# Patient Record
Sex: MALE | Race: WHITE | Employment: OTHER | URBAN - METROPOLITAN AREA
[De-identification: names, ages, dates, MRNs, and addresses within clinical notes are randomized per-mention and may not be internally consistent; named-entity substitution may affect disease eponyms.]

---

## 2022-10-31 ENCOUNTER — HOSPITAL ENCOUNTER (OUTPATIENT)
Age: 52
Setting detail: SPECIMEN
Discharge: HOME | End: 2022-10-31

## 2022-10-31 ENCOUNTER — HOSPITAL ENCOUNTER (OUTPATIENT)
Dept: PREADMISSION TESTING | Age: 52
Discharge: HOME | End: 2022-11-04
Payer: COMMERCIAL

## 2022-10-31 VITALS
OXYGEN SATURATION: 98 % | SYSTOLIC BLOOD PRESSURE: 156 MMHG | WEIGHT: 228 LBS | BODY MASS INDEX: 30.88 KG/M2 | TEMPERATURE: 97.9 F | RESPIRATION RATE: 16 BRPM | HEIGHT: 72 IN | HEART RATE: 118 BPM | DIASTOLIC BLOOD PRESSURE: 96 MMHG

## 2022-10-31 DIAGNOSIS — Z01.818 PRE-OP TESTING: ICD-10-CM

## 2022-10-31 DIAGNOSIS — Z01.818 PRE-OP TESTING: Primary | ICD-10-CM

## 2022-10-31 LAB
ANION GAP SERPL CALCULATED.3IONS-SCNC: 14 MMOL/L (ref 9–17)
BUN BLDV-MCNC: 13 MG/DL (ref 6–20)
CALCIUM SERPL-MCNC: 9.5 MG/DL (ref 8.6–10.4)
CHLORIDE BLD-SCNC: 105 MMOL/L (ref 98–107)
CO2: 24 MMOL/L (ref 20–31)
CREAT SERPL-MCNC: 1.01 MG/DL (ref 0.7–1.2)
GFR SERPL CREATININE-BSD FRML MDRD: >60 ML/MIN/1.73M2
GLUCOSE BLD-MCNC: 67 MG/DL (ref 70–99)
HCT VFR BLD CALC: 56.1 % (ref 40.7–50.3)
HEMOGLOBIN: 16.6 G/DL (ref 13–17)
MCH RBC QN AUTO: 31 PG (ref 25.2–33.5)
MCHC RBC AUTO-ENTMCNC: 29.6 G/DL (ref 28.4–34.8)
MCV RBC AUTO: 104.9 FL (ref 82.6–102.9)
NRBC AUTOMATED: 0 PER 100 WBC
PDW BLD-RTO: 13.9 % (ref 11.8–14.4)
PLATELET # BLD: 271 K/UL (ref 138–453)
PMV BLD AUTO: 11.2 FL (ref 8.1–13.5)
POTASSIUM SERPL-SCNC: 5.1 MMOL/L (ref 3.7–5.3)
RBC # BLD: 5.35 M/UL (ref 4.21–5.77)
SODIUM BLD-SCNC: 143 MMOL/L (ref 135–144)
WBC # BLD: 13.7 K/UL (ref 3.5–11.3)

## 2022-10-31 PROCEDURE — 93005 ELECTROCARDIOGRAM TRACING: CPT | Performed by: ANESTHESIOLOGY

## 2022-10-31 RX ORDER — CLONAZEPAM 2 MG/1
2 TABLET ORAL NIGHTLY
COMMUNITY

## 2022-10-31 RX ORDER — LEVOTHYROXINE SODIUM 0.1 MG/1
100 TABLET ORAL 2 TIMES DAILY
COMMUNITY

## 2022-10-31 RX ORDER — LOSARTAN POTASSIUM 100 MG/1
100 TABLET ORAL DAILY
COMMUNITY

## 2022-10-31 RX ORDER — CYCLOBENZAPRINE HCL 10 MG
10 TABLET ORAL 3 TIMES DAILY PRN
COMMUNITY

## 2022-10-31 RX ORDER — ASPIRIN 81 MG/1
81 TABLET ORAL DAILY
COMMUNITY

## 2022-10-31 RX ORDER — LAMOTRIGINE 100 MG/1
100 TABLET ORAL 3 TIMES DAILY
COMMUNITY

## 2022-10-31 RX ORDER — EXENATIDE 2 MG
2 KIT SUBCUTANEOUS WEEKLY
COMMUNITY

## 2022-10-31 RX ORDER — ROPINIROLE 1 MG/1
1 TABLET, FILM COATED ORAL NIGHTLY
COMMUNITY

## 2022-10-31 RX ORDER — ATORVASTATIN CALCIUM 80 MG/1
80 TABLET, FILM COATED ORAL DAILY
COMMUNITY

## 2022-10-31 RX ORDER — CARBAMAZEPINE 200 MG/1
200 CAPSULE, EXTENDED RELEASE ORAL 2 TIMES DAILY
COMMUNITY

## 2022-10-31 RX ORDER — LIOTHYRONINE SODIUM 25 UG/1
25 TABLET ORAL 2 TIMES DAILY
COMMUNITY

## 2022-10-31 RX ORDER — BUPROPION HYDROCHLORIDE 300 MG/1
300 TABLET ORAL EVERY MORNING
COMMUNITY

## 2022-10-31 RX ORDER — RISPERIDONE 2 MG/1
2 TABLET ORAL DAILY
COMMUNITY

## 2022-10-31 RX ORDER — QUETIAPINE FUMARATE 400 MG/1
400 TABLET, FILM COATED ORAL NIGHTLY
COMMUNITY

## 2022-10-31 RX ORDER — CLONIDINE HYDROCHLORIDE 0.1 MG/1
0.1 TABLET ORAL 2 TIMES DAILY
COMMUNITY

## 2022-10-31 RX ORDER — GABAPENTIN 600 MG/1
600 TABLET ORAL 3 TIMES DAILY
COMMUNITY

## 2022-10-31 RX ORDER — GLIPIZIDE 10 MG/1
10 TABLET ORAL
COMMUNITY

## 2022-10-31 ASSESSMENT — PAIN DESCRIPTION - LOCATION: LOCATION: BACK;HIP

## 2022-10-31 ASSESSMENT — PAIN DESCRIPTION - ORIENTATION: ORIENTATION: RIGHT

## 2022-11-01 LAB
EKG ATRIAL RATE: 101 BPM
EKG P AXIS: 48 DEGREES
EKG P-R INTERVAL: 182 MS
EKG Q-T INTERVAL: 336 MS
EKG QRS DURATION: 96 MS
EKG QTC CALCULATION (BAZETT): 435 MS
EKG R AXIS: 62 DEGREES
EKG T AXIS: 53 DEGREES
EKG VENTRICULAR RATE: 101 BPM

## 2022-11-29 ENCOUNTER — ANESTHESIA EVENT (OUTPATIENT)
Dept: OPERATING ROOM | Age: 52
End: 2022-11-29
Payer: COMMERCIAL

## 2022-12-05 ENCOUNTER — HOSPITAL ENCOUNTER (OUTPATIENT)
Age: 52
LOS: 2 days | Discharge: HOME | End: 2022-12-07
Attending: STUDENT IN AN ORGANIZED HEALTH CARE EDUCATION/TRAINING PROGRAM | Admitting: STUDENT IN AN ORGANIZED HEALTH CARE EDUCATION/TRAINING PROGRAM
Payer: COMMERCIAL

## 2022-12-05 ENCOUNTER — ANESTHESIA (OUTPATIENT)
Dept: OPERATING ROOM | Age: 52
End: 2022-12-05
Payer: COMMERCIAL

## 2022-12-05 ENCOUNTER — APPOINTMENT (OUTPATIENT)
Dept: GENERAL RADIOLOGY | Age: 52
End: 2022-12-05
Attending: STUDENT IN AN ORGANIZED HEALTH CARE EDUCATION/TRAINING PROGRAM
Payer: COMMERCIAL

## 2022-12-05 DIAGNOSIS — G89.18 POST-OP PAIN: ICD-10-CM

## 2022-12-05 DIAGNOSIS — Z98.1 S/P LUMBAR SPINAL FUSION: Primary | ICD-10-CM

## 2022-12-05 LAB
GLUCOSE BLD-MCNC: 167 MG/DL (ref 75–110)
GLUCOSE BLD-MCNC: 177 MG/DL (ref 75–110)

## 2022-12-05 PROCEDURE — 2580000003 HC RX 258: Performed by: STUDENT IN AN ORGANIZED HEALTH CARE EDUCATION/TRAINING PROGRAM

## 2022-12-05 PROCEDURE — 2500000003 HC RX 250 WO HCPCS: Performed by: STUDENT IN AN ORGANIZED HEALTH CARE EDUCATION/TRAINING PROGRAM

## 2022-12-05 PROCEDURE — 2709999900 HC NON-CHARGEABLE SUPPLY: Performed by: STUDENT IN AN ORGANIZED HEALTH CARE EDUCATION/TRAINING PROGRAM

## 2022-12-05 PROCEDURE — 6360000002 HC RX W HCPCS: Performed by: STUDENT IN AN ORGANIZED HEALTH CARE EDUCATION/TRAINING PROGRAM

## 2022-12-05 PROCEDURE — 2580000003 HC RX 258: Performed by: NURSE ANESTHETIST, CERTIFIED REGISTERED

## 2022-12-05 PROCEDURE — 6370000000 HC RX 637 (ALT 250 FOR IP): Performed by: STUDENT IN AN ORGANIZED HEALTH CARE EDUCATION/TRAINING PROGRAM

## 2022-12-05 PROCEDURE — 6360000002 HC RX W HCPCS: Performed by: NURSE ANESTHETIST, CERTIFIED REGISTERED

## 2022-12-05 PROCEDURE — 2500000003 HC RX 250 WO HCPCS: Performed by: ANESTHESIOLOGY

## 2022-12-05 PROCEDURE — 6360000002 HC RX W HCPCS

## 2022-12-05 PROCEDURE — 6370000000 HC RX 637 (ALT 250 FOR IP): Performed by: NURSE PRACTITIONER

## 2022-12-05 PROCEDURE — 3700000001 HC ADD 15 MINUTES (ANESTHESIA): Performed by: STUDENT IN AN ORGANIZED HEALTH CARE EDUCATION/TRAINING PROGRAM

## 2022-12-05 PROCEDURE — 3600000014 HC SURGERY LEVEL 4 ADDTL 15MIN: Performed by: STUDENT IN AN ORGANIZED HEALTH CARE EDUCATION/TRAINING PROGRAM

## 2022-12-05 PROCEDURE — 6360000002 HC RX W HCPCS: Performed by: ANESTHESIOLOGY

## 2022-12-05 PROCEDURE — 3700000000 HC ANESTHESIA ATTENDED CARE: Performed by: STUDENT IN AN ORGANIZED HEALTH CARE EDUCATION/TRAINING PROGRAM

## 2022-12-05 PROCEDURE — 2060000000 HC ICU INTERMEDIATE R&B

## 2022-12-05 PROCEDURE — 3600000004 HC SURGERY LEVEL 4 BASE: Performed by: STUDENT IN AN ORGANIZED HEALTH CARE EDUCATION/TRAINING PROGRAM

## 2022-12-05 PROCEDURE — C1713 ANCHOR/SCREW BN/BN,TIS/BN: HCPCS | Performed by: STUDENT IN AN ORGANIZED HEALTH CARE EDUCATION/TRAINING PROGRAM

## 2022-12-05 PROCEDURE — 7100000001 HC PACU RECOVERY - ADDTL 15 MIN: Performed by: STUDENT IN AN ORGANIZED HEALTH CARE EDUCATION/TRAINING PROGRAM

## 2022-12-05 PROCEDURE — 2500000003 HC RX 250 WO HCPCS: Performed by: NURSE ANESTHETIST, CERTIFIED REGISTERED

## 2022-12-05 PROCEDURE — 82947 ASSAY GLUCOSE BLOOD QUANT: CPT

## 2022-12-05 PROCEDURE — C9359 IMPLNT,BON VOID FILLER-PUTTY: HCPCS | Performed by: STUDENT IN AN ORGANIZED HEALTH CARE EDUCATION/TRAINING PROGRAM

## 2022-12-05 PROCEDURE — 7100000000 HC PACU RECOVERY - FIRST 15 MIN: Performed by: STUDENT IN AN ORGANIZED HEALTH CARE EDUCATION/TRAINING PROGRAM

## 2022-12-05 PROCEDURE — 3209999900 FLUORO FOR SURGICAL PROCEDURES

## 2022-12-05 PROCEDURE — 2720000010 HC SURG SUPPLY STERILE: Performed by: STUDENT IN AN ORGANIZED HEALTH CARE EDUCATION/TRAINING PROGRAM

## 2022-12-05 DEVICE — CONNECTOR 779145555 TI SDLD SD 5.5 CL5.5
Type: IMPLANTABLE DEVICE | Site: SPINE LUMBAR | Status: FUNCTIONAL
Brand: CD HORIZON® SPINAL SYSTEM

## 2022-12-05 DEVICE — GRAFT BNE SUB SM 1ML CRYOPRESERVED VIABLE CORT CANC BNE: Type: IMPLANTABLE DEVICE | Site: SPINE LUMBAR | Status: FUNCTIONAL

## 2022-12-05 DEVICE — DBM 006005 PROGENIX PLUS 5CC
Type: IMPLANTABLE DEVICE | Site: SPINE LUMBAR | Status: FUNCTIONAL
Brand: PROGENIX® PUTTY AND PROGENIX® PLUS

## 2022-12-05 RX ORDER — METOCLOPRAMIDE HYDROCHLORIDE 5 MG/ML
10 INJECTION INTRAMUSCULAR; INTRAVENOUS ONCE
Status: COMPLETED | OUTPATIENT
Start: 2022-12-05 | End: 2022-12-05

## 2022-12-05 RX ORDER — BUPIVACAINE HYDROCHLORIDE AND EPINEPHRINE 5; 5 MG/ML; UG/ML
INJECTION, SOLUTION EPIDURAL; INTRACAUDAL; PERINEURAL
Status: DISCONTINUED
Start: 2022-12-05 | End: 2022-12-05

## 2022-12-05 RX ORDER — DEXAMETHASONE SODIUM PHOSPHATE 4 MG/ML
6 INJECTION, SOLUTION INTRA-ARTICULAR; INTRALESIONAL; INTRAMUSCULAR; INTRAVENOUS; SOFT TISSUE EVERY 6 HOURS
Status: COMPLETED | OUTPATIENT
Start: 2022-12-05 | End: 2022-12-06

## 2022-12-05 RX ORDER — SENNA AND DOCUSATE SODIUM 50; 8.6 MG/1; MG/1
1 TABLET, FILM COATED ORAL 2 TIMES DAILY
Status: DISCONTINUED | OUTPATIENT
Start: 2022-12-05 | End: 2022-12-07 | Stop reason: HOSPADM

## 2022-12-05 RX ORDER — GLYCOPYRROLATE 0.2 MG/ML
INJECTION INTRAMUSCULAR; INTRAVENOUS PRN
Status: DISCONTINUED | OUTPATIENT
Start: 2022-12-05 | End: 2022-12-05 | Stop reason: SDUPTHER

## 2022-12-05 RX ORDER — FAMOTIDINE 20 MG/1
20 TABLET, FILM COATED ORAL 2 TIMES DAILY
Status: DISCONTINUED | OUTPATIENT
Start: 2022-12-05 | End: 2022-12-07 | Stop reason: HOSPADM

## 2022-12-05 RX ORDER — MORPHINE SULFATE 10 MG/ML
INJECTION, SOLUTION INTRAMUSCULAR; INTRAVENOUS PRN
Status: DISCONTINUED | OUTPATIENT
Start: 2022-12-05 | End: 2022-12-05 | Stop reason: SDUPTHER

## 2022-12-05 RX ORDER — QUETIAPINE FUMARATE 100 MG/1
400 TABLET, FILM COATED ORAL NIGHTLY
Status: DISCONTINUED | OUTPATIENT
Start: 2022-12-05 | End: 2022-12-07 | Stop reason: HOSPADM

## 2022-12-05 RX ORDER — CLONIDINE HYDROCHLORIDE 0.1 MG/1
0.1 TABLET ORAL 2 TIMES DAILY
Status: DISCONTINUED | OUTPATIENT
Start: 2022-12-05 | End: 2022-12-07 | Stop reason: HOSPADM

## 2022-12-05 RX ORDER — OXYCODONE HYDROCHLORIDE 5 MG/1
10 TABLET ORAL EVERY 4 HOURS PRN
Status: DISCONTINUED | OUTPATIENT
Start: 2022-12-05 | End: 2022-12-07 | Stop reason: HOSPADM

## 2022-12-05 RX ORDER — VANCOMYCIN HYDROCHLORIDE 1 G/20ML
INJECTION, POWDER, LYOPHILIZED, FOR SOLUTION INTRAVENOUS
Status: DISCONTINUED
Start: 2022-12-05 | End: 2022-12-05

## 2022-12-05 RX ORDER — EPHEDRINE SULFATE/0.9% NACL/PF 50 MG/5 ML
SYRINGE (ML) INTRAVENOUS PRN
Status: DISCONTINUED | OUTPATIENT
Start: 2022-12-05 | End: 2022-12-05 | Stop reason: SDUPTHER

## 2022-12-05 RX ORDER — TRANEXAMIC ACID 10 MG/ML
INJECTION, SOLUTION INTRAVENOUS PRN
Status: DISCONTINUED | OUTPATIENT
Start: 2022-12-05 | End: 2022-12-05 | Stop reason: SDUPTHER

## 2022-12-05 RX ORDER — SODIUM CHLORIDE 0.9 % (FLUSH) 0.9 %
5-40 SYRINGE (ML) INJECTION PRN
Status: DISCONTINUED | OUTPATIENT
Start: 2022-12-05 | End: 2022-12-05 | Stop reason: HOSPADM

## 2022-12-05 RX ORDER — SODIUM CHLORIDE 0.9 % (FLUSH) 0.9 %
5-40 SYRINGE (ML) INJECTION PRN
Status: DISCONTINUED | OUTPATIENT
Start: 2022-12-05 | End: 2022-12-07 | Stop reason: HOSPADM

## 2022-12-05 RX ORDER — GABAPENTIN 600 MG/1
600 TABLET ORAL 3 TIMES DAILY
Status: DISCONTINUED | OUTPATIENT
Start: 2022-12-05 | End: 2022-12-05

## 2022-12-05 RX ORDER — LIOTHYRONINE SODIUM 25 UG/1
12.5 TABLET ORAL 2 TIMES DAILY
Status: DISCONTINUED | OUTPATIENT
Start: 2022-12-06 | End: 2022-12-07 | Stop reason: HOSPADM

## 2022-12-05 RX ORDER — METOPROLOL TARTRATE 5 MG/5ML
INJECTION INTRAVENOUS PRN
Status: DISCONTINUED | OUTPATIENT
Start: 2022-12-05 | End: 2022-12-05 | Stop reason: SDUPTHER

## 2022-12-05 RX ORDER — LOSARTAN POTASSIUM 50 MG/1
100 TABLET ORAL DAILY
Status: DISCONTINUED | OUTPATIENT
Start: 2022-12-06 | End: 2022-12-07 | Stop reason: HOSPADM

## 2022-12-05 RX ORDER — MORPHINE SULFATE 2 MG/ML
1 INJECTION, SOLUTION INTRAMUSCULAR; INTRAVENOUS EVERY 5 MIN PRN
Status: DISCONTINUED | OUTPATIENT
Start: 2022-12-05 | End: 2022-12-05 | Stop reason: HOSPADM

## 2022-12-05 RX ORDER — BUPROPION HYDROCHLORIDE 150 MG/1
300 TABLET ORAL EVERY MORNING
Status: DISCONTINUED | OUTPATIENT
Start: 2022-12-06 | End: 2022-12-07 | Stop reason: HOSPADM

## 2022-12-05 RX ORDER — CEFAZOLIN 2 G/1
INJECTION, POWDER, FOR SOLUTION INTRAMUSCULAR; INTRAVENOUS
Status: DISCONTINUED
Start: 2022-12-05 | End: 2022-12-05

## 2022-12-05 RX ORDER — ONDANSETRON 4 MG/1
4 TABLET, ORALLY DISINTEGRATING ORAL EVERY 8 HOURS PRN
Status: DISCONTINUED | OUTPATIENT
Start: 2022-12-05 | End: 2022-12-07 | Stop reason: HOSPADM

## 2022-12-05 RX ORDER — MIDAZOLAM HYDROCHLORIDE 1 MG/ML
INJECTION INTRAMUSCULAR; INTRAVENOUS PRN
Status: DISCONTINUED | OUTPATIENT
Start: 2022-12-05 | End: 2022-12-05 | Stop reason: SDUPTHER

## 2022-12-05 RX ORDER — BUPIVACAINE HYDROCHLORIDE AND EPINEPHRINE 5; 5 MG/ML; UG/ML
INJECTION, SOLUTION EPIDURAL; INTRACAUDAL; PERINEURAL PRN
Status: DISCONTINUED | OUTPATIENT
Start: 2022-12-05 | End: 2022-12-05 | Stop reason: ALTCHOICE

## 2022-12-05 RX ORDER — KETOROLAC TROMETHAMINE 30 MG/ML
INJECTION, SOLUTION INTRAMUSCULAR; INTRAVENOUS PRN
Status: DISCONTINUED | OUTPATIENT
Start: 2022-12-05 | End: 2022-12-05 | Stop reason: SDUPTHER

## 2022-12-05 RX ORDER — SODIUM CHLORIDE 9 MG/ML
INJECTION, SOLUTION INTRAVENOUS PRN
Status: DISCONTINUED | OUTPATIENT
Start: 2022-12-05 | End: 2022-12-05 | Stop reason: HOSPADM

## 2022-12-05 RX ORDER — LIOTHYRONINE SODIUM 25 UG/1
25 TABLET ORAL 2 TIMES DAILY
Status: DISCONTINUED | OUTPATIENT
Start: 2022-12-05 | End: 2022-12-05

## 2022-12-05 RX ORDER — POLYETHYLENE GLYCOL 3350 17 G/17G
17 POWDER, FOR SOLUTION ORAL DAILY PRN
Status: DISCONTINUED | OUTPATIENT
Start: 2022-12-05 | End: 2022-12-07 | Stop reason: HOSPADM

## 2022-12-05 RX ORDER — LEVOTHYROXINE SODIUM 0.05 MG/1
50 TABLET ORAL 2 TIMES DAILY
Status: DISCONTINUED | OUTPATIENT
Start: 2022-12-05 | End: 2022-12-07 | Stop reason: HOSPADM

## 2022-12-05 RX ORDER — RISPERIDONE 1 MG/1
2 TABLET ORAL DAILY
Status: DISCONTINUED | OUTPATIENT
Start: 2022-12-06 | End: 2022-12-05

## 2022-12-05 RX ORDER — MIDAZOLAM HYDROCHLORIDE 1 MG/ML
INJECTION INTRAMUSCULAR; INTRAVENOUS
Status: COMPLETED
Start: 2022-12-05 | End: 2022-12-05

## 2022-12-05 RX ORDER — LAMOTRIGINE 100 MG/1
100 TABLET ORAL 3 TIMES DAILY
Status: DISCONTINUED | OUTPATIENT
Start: 2022-12-05 | End: 2022-12-07 | Stop reason: HOSPADM

## 2022-12-05 RX ORDER — TRANEXAMIC ACID 10 MG/ML
INJECTION, SOLUTION INTRAVENOUS
Status: COMPLETED
Start: 2022-12-05 | End: 2022-12-05

## 2022-12-05 RX ORDER — NEOSTIGMINE METHYLSULFATE 5 MG/5 ML
SYRINGE (ML) INTRAVENOUS PRN
Status: DISCONTINUED | OUTPATIENT
Start: 2022-12-05 | End: 2022-12-05 | Stop reason: SDUPTHER

## 2022-12-05 RX ORDER — ACETAMINOPHEN 500 MG
1000 TABLET ORAL EVERY 6 HOURS
Status: DISCONTINUED | OUTPATIENT
Start: 2022-12-05 | End: 2022-12-07 | Stop reason: HOSPADM

## 2022-12-05 RX ORDER — PROPOFOL 10 MG/ML
INJECTION, EMULSION INTRAVENOUS PRN
Status: DISCONTINUED | OUTPATIENT
Start: 2022-12-05 | End: 2022-12-05 | Stop reason: SDUPTHER

## 2022-12-05 RX ORDER — OXYCODONE HYDROCHLORIDE 5 MG/1
5 TABLET ORAL EVERY 4 HOURS PRN
Status: DISCONTINUED | OUTPATIENT
Start: 2022-12-05 | End: 2022-12-07 | Stop reason: HOSPADM

## 2022-12-05 RX ORDER — SODIUM CHLORIDE 0.9 % (FLUSH) 0.9 %
5-40 SYRINGE (ML) INJECTION EVERY 12 HOURS SCHEDULED
Status: DISCONTINUED | OUTPATIENT
Start: 2022-12-05 | End: 2022-12-05 | Stop reason: HOSPADM

## 2022-12-05 RX ORDER — CLONAZEPAM 1 MG/1
2 TABLET ORAL NIGHTLY
Status: DISCONTINUED | OUTPATIENT
Start: 2022-12-05 | End: 2022-12-07 | Stop reason: HOSPADM

## 2022-12-05 RX ORDER — ONDANSETRON 2 MG/ML
4 INJECTION INTRAMUSCULAR; INTRAVENOUS EVERY 6 HOURS PRN
Status: DISCONTINUED | OUTPATIENT
Start: 2022-12-05 | End: 2022-12-07 | Stop reason: HOSPADM

## 2022-12-05 RX ORDER — PHENYLEPHRINE HYDROCHLORIDE 10 MG/ML
INJECTION INTRAVENOUS PRN
Status: DISCONTINUED | OUTPATIENT
Start: 2022-12-05 | End: 2022-12-05 | Stop reason: SDUPTHER

## 2022-12-05 RX ORDER — SODIUM CHLORIDE 9 MG/ML
25 INJECTION, SOLUTION INTRAVENOUS PRN
Status: DISCONTINUED | OUTPATIENT
Start: 2022-12-05 | End: 2022-12-05 | Stop reason: HOSPADM

## 2022-12-05 RX ORDER — GABAPENTIN 600 MG/1
600 TABLET ORAL NIGHTLY
Status: DISCONTINUED | OUTPATIENT
Start: 2022-12-05 | End: 2022-12-06 | Stop reason: RX

## 2022-12-05 RX ORDER — ATORVASTATIN CALCIUM 40 MG/1
80 TABLET, FILM COATED ORAL DAILY
Status: DISCONTINUED | OUTPATIENT
Start: 2022-12-06 | End: 2022-12-07 | Stop reason: HOSPADM

## 2022-12-05 RX ORDER — MORPHINE SULFATE 2 MG/ML
INJECTION, SOLUTION INTRAMUSCULAR; INTRAVENOUS
Status: COMPLETED
Start: 2022-12-05 | End: 2022-12-05

## 2022-12-05 RX ORDER — MIDAZOLAM HYDROCHLORIDE 2 MG/2ML
2 INJECTION, SOLUTION INTRAMUSCULAR; INTRAVENOUS ONCE
Status: COMPLETED | OUTPATIENT
Start: 2022-12-05 | End: 2022-12-05

## 2022-12-05 RX ORDER — ONDANSETRON 2 MG/ML
INJECTION INTRAMUSCULAR; INTRAVENOUS
Status: COMPLETED
Start: 2022-12-05 | End: 2022-12-05

## 2022-12-05 RX ORDER — SODIUM CHLORIDE, SODIUM LACTATE, POTASSIUM CHLORIDE, CALCIUM CHLORIDE 600; 310; 30; 20 MG/100ML; MG/100ML; MG/100ML; MG/100ML
INJECTION, SOLUTION INTRAVENOUS CONTINUOUS
Status: DISCONTINUED | OUTPATIENT
Start: 2022-12-05 | End: 2022-12-05

## 2022-12-05 RX ORDER — LIDOCAINE HYDROCHLORIDE 10 MG/ML
INJECTION, SOLUTION EPIDURAL; INFILTRATION; INTRACAUDAL; PERINEURAL PRN
Status: DISCONTINUED | OUTPATIENT
Start: 2022-12-05 | End: 2022-12-05 | Stop reason: SDUPTHER

## 2022-12-05 RX ORDER — ONDANSETRON 2 MG/ML
INJECTION INTRAMUSCULAR; INTRAVENOUS PRN
Status: DISCONTINUED | OUTPATIENT
Start: 2022-12-05 | End: 2022-12-05 | Stop reason: SDUPTHER

## 2022-12-05 RX ORDER — LEVOTHYROXINE SODIUM 0.05 MG/1
100 TABLET ORAL 2 TIMES DAILY
Status: DISCONTINUED | OUTPATIENT
Start: 2022-12-05 | End: 2022-12-05

## 2022-12-05 RX ORDER — CYCLOBENZAPRINE HCL 10 MG
10 TABLET ORAL 3 TIMES DAILY PRN
Status: DISCONTINUED | OUTPATIENT
Start: 2022-12-05 | End: 2022-12-07 | Stop reason: HOSPADM

## 2022-12-05 RX ORDER — GINSENG 100 MG
CAPSULE ORAL PRN
Status: DISCONTINUED | OUTPATIENT
Start: 2022-12-05 | End: 2022-12-05 | Stop reason: ALTCHOICE

## 2022-12-05 RX ORDER — ROPINIROLE 1 MG/1
1 TABLET, FILM COATED ORAL NIGHTLY
Status: DISCONTINUED | OUTPATIENT
Start: 2022-12-05 | End: 2022-12-07 | Stop reason: HOSPADM

## 2022-12-05 RX ORDER — MEPERIDINE HYDROCHLORIDE 50 MG/ML
12.5 INJECTION INTRAMUSCULAR; INTRAVENOUS; SUBCUTANEOUS ONCE
Status: DISCONTINUED | OUTPATIENT
Start: 2022-12-05 | End: 2022-12-05 | Stop reason: HOSPADM

## 2022-12-05 RX ORDER — RISPERIDONE 1 MG/1
2 TABLET ORAL NIGHTLY
Status: DISCONTINUED | OUTPATIENT
Start: 2022-12-05 | End: 2022-12-07 | Stop reason: HOSPADM

## 2022-12-05 RX ORDER — SODIUM CHLORIDE 9 MG/ML
INJECTION, SOLUTION INTRAVENOUS CONTINUOUS
Status: DISCONTINUED | OUTPATIENT
Start: 2022-12-05 | End: 2022-12-05

## 2022-12-05 RX ORDER — ROCURONIUM BROMIDE 10 MG/ML
INJECTION, SOLUTION INTRAVENOUS PRN
Status: DISCONTINUED | OUTPATIENT
Start: 2022-12-05 | End: 2022-12-05 | Stop reason: SDUPTHER

## 2022-12-05 RX ORDER — GINSENG 100 MG
CAPSULE ORAL
Status: DISPENSED
Start: 2022-12-05 | End: 2022-12-06

## 2022-12-05 RX ORDER — ONDANSETRON 2 MG/ML
4 INJECTION INTRAMUSCULAR; INTRAVENOUS
Status: COMPLETED | OUTPATIENT
Start: 2022-12-05 | End: 2022-12-05

## 2022-12-05 RX ORDER — LIOTHYRONINE SODIUM 5 UG/1
2.5 TABLET ORAL 2 TIMES DAILY
Status: DISCONTINUED | OUTPATIENT
Start: 2022-12-05 | End: 2022-12-05

## 2022-12-05 RX ORDER — SODIUM PHOSPHATE, DIBASIC AND SODIUM PHOSPHATE, MONOBASIC 7; 19 G/133ML; G/133ML
1 ENEMA RECTAL DAILY PRN
Status: DISCONTINUED | OUTPATIENT
Start: 2022-12-05 | End: 2022-12-07 | Stop reason: HOSPADM

## 2022-12-05 RX ORDER — GLIPIZIDE 5 MG/1
10 TABLET ORAL
Status: DISCONTINUED | OUTPATIENT
Start: 2022-12-06 | End: 2022-12-07 | Stop reason: HOSPADM

## 2022-12-05 RX ORDER — SODIUM CHLORIDE 9 MG/ML
INJECTION, SOLUTION INTRAVENOUS CONTINUOUS PRN
Status: DISCONTINUED | OUTPATIENT
Start: 2022-12-05 | End: 2022-12-05 | Stop reason: SDUPTHER

## 2022-12-05 RX ORDER — METOCLOPRAMIDE HYDROCHLORIDE 5 MG/ML
INJECTION INTRAMUSCULAR; INTRAVENOUS
Status: COMPLETED
Start: 2022-12-05 | End: 2022-12-05

## 2022-12-05 RX ORDER — DIPHENHYDRAMINE HYDROCHLORIDE 50 MG/ML
12.5 INJECTION INTRAMUSCULAR; INTRAVENOUS
Status: DISCONTINUED | OUTPATIENT
Start: 2022-12-05 | End: 2022-12-05 | Stop reason: HOSPADM

## 2022-12-05 RX ORDER — SODIUM CHLORIDE 9 MG/ML
INJECTION, SOLUTION INTRAVENOUS PRN
Status: DISCONTINUED | OUTPATIENT
Start: 2022-12-05 | End: 2022-12-07 | Stop reason: HOSPADM

## 2022-12-05 RX ORDER — CARBAMAZEPINE 100 MG/1
200 TABLET, EXTENDED RELEASE ORAL 2 TIMES DAILY
Status: DISCONTINUED | OUTPATIENT
Start: 2022-12-06 | End: 2022-12-05

## 2022-12-05 RX ORDER — SODIUM CHLORIDE 0.9 % (FLUSH) 0.9 %
5-40 SYRINGE (ML) INJECTION EVERY 12 HOURS SCHEDULED
Status: DISCONTINUED | OUTPATIENT
Start: 2022-12-05 | End: 2022-12-07 | Stop reason: HOSPADM

## 2022-12-05 RX ORDER — MORPHINE SULFATE 2 MG/ML
2 INJECTION, SOLUTION INTRAMUSCULAR; INTRAVENOUS EVERY 5 MIN PRN
Status: DISCONTINUED | OUTPATIENT
Start: 2022-12-05 | End: 2022-12-07 | Stop reason: HOSPADM

## 2022-12-05 RX ADMIN — PHENYLEPHRINE HYDROCHLORIDE 200 MCG: 10 INJECTION INTRAVENOUS at 13:11

## 2022-12-05 RX ADMIN — PHENYLEPHRINE HYDROCHLORIDE 200 MCG: 10 INJECTION INTRAVENOUS at 13:09

## 2022-12-05 RX ADMIN — PHENYLEPHRINE HYDROCHLORIDE 100 MCG: 10 INJECTION INTRAVENOUS at 13:06

## 2022-12-05 RX ADMIN — TRANEXAMIC ACID 1 G: 10 INJECTION, SOLUTION INTRAVENOUS at 13:46

## 2022-12-05 RX ADMIN — ROCURONIUM BROMIDE 50 MG: 10 INJECTION INTRAVENOUS at 12:41

## 2022-12-05 RX ADMIN — PHENYLEPHRINE HYDROCHLORIDE 150 MCG/MIN: 10 INJECTION INTRAVENOUS at 13:22

## 2022-12-05 RX ADMIN — SODIUM CHLORIDE: 0.9 INJECTION, SOLUTION INTRAVENOUS at 12:44

## 2022-12-05 RX ADMIN — MIDAZOLAM 2 MG: 1 INJECTION INTRAMUSCULAR; INTRAVENOUS at 19:24

## 2022-12-05 RX ADMIN — GLYCOPYRROLATE 0.2 MG: 0.2 INJECTION INTRAMUSCULAR; INTRAVENOUS at 18:12

## 2022-12-05 RX ADMIN — CYCLOBENZAPRINE 10 MG: 10 TABLET, FILM COATED ORAL at 21:50

## 2022-12-05 RX ADMIN — LIDOCAINE HYDROCHLORIDE 20 MG: 10 INJECTION, SOLUTION EPIDURAL; INFILTRATION; INTRACAUDAL; PERINEURAL at 12:41

## 2022-12-05 RX ADMIN — MIDAZOLAM 2 MG: 1 INJECTION INTRAMUSCULAR; INTRAVENOUS at 12:38

## 2022-12-05 RX ADMIN — MORPHINE SULFATE 2 MG: 2 INJECTION, SOLUTION INTRAMUSCULAR; INTRAVENOUS at 20:00

## 2022-12-05 RX ADMIN — PHENYLEPHRINE HYDROCHLORIDE 200 MCG: 10 INJECTION INTRAVENOUS at 13:20

## 2022-12-05 RX ADMIN — SENNOSIDES AND DOCUSATE SODIUM 1 TABLET: 50; 8.6 TABLET ORAL at 21:50

## 2022-12-05 RX ADMIN — MORPHINE SULFATE 5 MG: 10 INJECTION, SOLUTION INTRAMUSCULAR; INTRAVENOUS at 15:33

## 2022-12-05 RX ADMIN — Medication 3 MG: at 18:13

## 2022-12-05 RX ADMIN — METOPROLOL TARTRATE 1 MG: 5 INJECTION INTRAVENOUS at 18:26

## 2022-12-05 RX ADMIN — ROCURONIUM BROMIDE 10 MG: 10 INJECTION INTRAVENOUS at 15:20

## 2022-12-05 RX ADMIN — ONDANSETRON 4 MG: 2 INJECTION INTRAMUSCULAR; INTRAVENOUS at 18:41

## 2022-12-05 RX ADMIN — ACETAMINOPHEN 1000 MG: 500 TABLET ORAL at 21:50

## 2022-12-05 RX ADMIN — MORPHINE SULFATE 2 MG: 2 INJECTION, SOLUTION INTRAMUSCULAR; INTRAVENOUS at 19:19

## 2022-12-05 RX ADMIN — PHENYLEPHRINE HYDROCHLORIDE 200 MCG: 10 INJECTION INTRAVENOUS at 13:16

## 2022-12-05 RX ADMIN — PHENYLEPHRINE HYDROCHLORIDE 200 MCG: 10 INJECTION INTRAVENOUS at 13:13

## 2022-12-05 RX ADMIN — HYDROMORPHONE HYDROCHLORIDE 1 MG: 1 INJECTION, SOLUTION INTRAMUSCULAR; INTRAVENOUS; SUBCUTANEOUS at 12:41

## 2022-12-05 RX ADMIN — ONDANSETRON 4 MG: 2 INJECTION INTRAMUSCULAR; INTRAVENOUS at 17:29

## 2022-12-05 RX ADMIN — DEXAMETHASONE SODIUM PHOSPHATE 6 MG: 4 INJECTION, SOLUTION INTRAMUSCULAR; INTRAVENOUS at 21:50

## 2022-12-05 RX ADMIN — HYDROMORPHONE HYDROCHLORIDE 0.5 MG: 1 INJECTION, SOLUTION INTRAMUSCULAR; INTRAVENOUS; SUBCUTANEOUS at 18:41

## 2022-12-05 RX ADMIN — METOPROLOL TARTRATE 1 MG: 5 INJECTION INTRAVENOUS at 18:35

## 2022-12-05 RX ADMIN — RISPERIDONE 2 MG: 1 TABLET ORAL at 22:44

## 2022-12-05 RX ADMIN — CLONIDINE HYDROCHLORIDE 0.1 MG: 0.1 TABLET ORAL at 21:50

## 2022-12-05 RX ADMIN — HYDROMORPHONE HYDROCHLORIDE 0.5 MG: 1 INJECTION, SOLUTION INTRAMUSCULAR; INTRAVENOUS; SUBCUTANEOUS at 19:07

## 2022-12-05 RX ADMIN — SODIUM CHLORIDE: 0.9 INJECTION, SOLUTION INTRAVENOUS at 13:26

## 2022-12-05 RX ADMIN — ROCURONIUM BROMIDE 20 MG: 10 INJECTION INTRAVENOUS at 13:50

## 2022-12-05 RX ADMIN — CEFAZOLIN 2000 MG: 2 INJECTION, POWDER, FOR SOLUTION INTRAMUSCULAR; INTRAVENOUS at 16:50

## 2022-12-05 RX ADMIN — PROPOFOL 200 MG: 10 INJECTION, EMULSION INTRAVENOUS at 12:41

## 2022-12-05 RX ADMIN — CEFAZOLIN 2000 MG: 2 INJECTION, POWDER, FOR SOLUTION INTRAMUSCULAR; INTRAVENOUS at 12:53

## 2022-12-05 RX ADMIN — METOCLOPRAMIDE HYDROCHLORIDE 10 MG: 5 INJECTION INTRAMUSCULAR; INTRAVENOUS at 19:00

## 2022-12-05 RX ADMIN — KETOROLAC TROMETHAMINE 30 MG: 30 INJECTION, SOLUTION INTRAMUSCULAR; INTRAVENOUS at 18:16

## 2022-12-05 RX ADMIN — ROCURONIUM BROMIDE 20 MG: 10 INJECTION INTRAVENOUS at 14:27

## 2022-12-05 RX ADMIN — METOCLOPRAMIDE 10 MG: 5 INJECTION, SOLUTION INTRAMUSCULAR; INTRAVENOUS at 19:00

## 2022-12-05 RX ADMIN — HYDROMORPHONE HYDROCHLORIDE 0.5 MG: 1 INJECTION, SOLUTION INTRAMUSCULAR; INTRAVENOUS; SUBCUTANEOUS at 18:55

## 2022-12-05 RX ADMIN — ROPINIROLE HYDROCHLORIDE 1 MG: 1 TABLET, FILM COATED ORAL at 21:49

## 2022-12-05 RX ADMIN — SODIUM CHLORIDE, PRESERVATIVE FREE 10 ML: 5 INJECTION INTRAVENOUS at 21:50

## 2022-12-05 RX ADMIN — CLONAZEPAM 2 MG: 1 TABLET ORAL at 21:50

## 2022-12-05 RX ADMIN — METOPROLOL TARTRATE 1 MG: 5 INJECTION INTRAVENOUS at 18:31

## 2022-12-05 RX ADMIN — MIDAZOLAM HYDROCHLORIDE 2 MG: 2 INJECTION, SOLUTION INTRAMUSCULAR; INTRAVENOUS at 19:24

## 2022-12-05 RX ADMIN — MORPHINE SULFATE 5 MG: 10 INJECTION, SOLUTION INTRAMUSCULAR; INTRAVENOUS at 15:47

## 2022-12-05 RX ADMIN — FAMOTIDINE 20 MG: 20 TABLET ORAL at 21:50

## 2022-12-05 RX ADMIN — QUETIAPINE FUMARATE 400 MG: 100 TABLET ORAL at 21:49

## 2022-12-05 RX ADMIN — Medication 10 MG: at 13:23

## 2022-12-05 ASSESSMENT — PAIN - FUNCTIONAL ASSESSMENT
PAIN_FUNCTIONAL_ASSESSMENT: PREVENTS OR INTERFERES SOME ACTIVE ACTIVITIES AND ADLS
PAIN_FUNCTIONAL_ASSESSMENT: 0-10

## 2022-12-05 ASSESSMENT — PAIN DESCRIPTION - LOCATION
LOCATION: BACK

## 2022-12-05 ASSESSMENT — PAIN DESCRIPTION - ORIENTATION: ORIENTATION: LEFT

## 2022-12-05 ASSESSMENT — PAIN DESCRIPTION - DESCRIPTORS
DESCRIPTORS: ACHING;BURNING;SPASM
DESCRIPTORS: ACHING;BURNING;SPASM
DESCRIPTORS: ACHING;BURNING
DESCRIPTORS: ACHING;BURNING;SPASM
DESCRIPTORS: ACHING;JABBING;SPASM
DESCRIPTORS: ACHING;BURNING;SPASM
DESCRIPTORS: BURNING;ACHING;SPASM
DESCRIPTORS: ACHING;BURNING;SPASM
DESCRIPTORS: ACHING;BURNING;SPASM

## 2022-12-05 ASSESSMENT — PAIN SCALES - GENERAL
PAINLEVEL_OUTOF10: 10
PAINLEVEL_OUTOF10: 9
PAINLEVEL_OUTOF10: 10
PAINLEVEL_OUTOF10: 9
PAINLEVEL_OUTOF10: 10

## 2022-12-05 ASSESSMENT — PAIN DESCRIPTION - PAIN TYPE
TYPE: SURGICAL PAIN

## 2022-12-05 NOTE — H&P
Surgical History and Physical Exam    Reason for surgery:  The patient is a 45 y.o. male for revision L2/3 laminectomy and extension of lumbar interbody fusion to L2-3. Past Medical History:    Past Medical History:   Diagnosis Date    Anxiety     Bipolar 1 disorder ([facility] [address]     Depression     Diabetes mellitus ([facility] [address]     Hyperlipidemia     Hypertension     PTSD (post-traumatic stress disorder)     Restless legs syndrome     Thyroid disease      Past Surgical History:    Past Surgical History:   Procedure Laterality Date    BACK SURGERY      x3 lumbar fusion    COLONOSCOPY      KNEE ARTHROSCOPY Right     TONSILLECTOMY       Medications Prior to Admission:   Prior to Admission medications    Medication Sig Start Date End Date Taking? Authorizing Provider   glipiZIDE (GLUCOTROL) 10 MG tablet Take 10 mg by mouth 2 times daily (before meals)    Historical Provider, MD   buPROPion (WELLBUTRIN XL) 300 MG extended release tablet Take 300 mg by mouth every morning 450 mg daily    Historical Provider, MD   risperiDONE (RISPERDAL) 2 MG tablet Take 2 mg by mouth daily    Historical Provider, MD   cyclobenzaprine (FLEXERIL) 10 MG tablet Take 10 mg by mouth 3 times daily as needed for Muscle spasms    Historical Provider, MD   carBAMazepine (CARBATROL) 200 MG extended release capsule Take 200 mg by mouth 2 times daily 3 tabs am and pm    Historical Provider, MD   losartan (COZAAR) 100 MG tablet Take 100 mg by mouth daily    Historical Provider, MD   aspirin 81 MG EC tablet Take 81 mg by mouth daily    Historical Provider, MD   QUEtiapine (SEROQUEL) 400 MG tablet Take 400 mg by mouth nightly    Historical Provider, MD   liothyronine (CYTOMEL) 25 MCG tablet Take 25 mcg by mouth in the morning and at bedtime 1/2 tab    Historical Provider, MD   gabapentin (NEURONTIN) 600 MG tablet Take 600 mg by mouth 3 times daily.     Historical Provider, MD   dapagliflozin (FARXIGA) 10 MG tablet Take 10 mg by mouth every morning    Historical Provider, MD   metFORMIN (GLUCOPHAGE) 1000 MG tablet Take 1,000 mg by mouth 2 times daily (with meals)    Historical Provider, MD   exenatide (BYDUREON) 2 MG SRER injection Inject 2 mg into the skin once a week On Tuesday    Historical Provider, MD   lamoTRIgine (LAMICTAL) 100 MG tablet Take 100 mg by mouth 3 times daily    Historical Provider, MD   atorvastatin (LIPITOR) 80 MG tablet Take 80 mg by mouth daily    Historical Provider, MD   clonazePAM (KLONOPIN) 2 MG tablet Take 2 mg by mouth nightly. Historical Provider, MD   cloNIDine (CATAPRES) 0.1 MG tablet Take 0.1 mg by mouth 2 times daily    Historical Provider, MD   levothyroxine (SYNTHROID) 100 MCG tablet Take 100 mcg by mouth in the morning and at bedtime 1/2 tab bid    Historical Provider, MD   rOPINIRole (REQUIP) 1 MG tablet Take 1 mg by mouth nightly    Historical Provider, MD     Allergies:    Fentanyl  Social History:   Social History     Socioeconomic History    Marital status:    Tobacco Use    Smoking status: Never    Smokeless tobacco: Never   Vaping Use    Vaping Use: Never used   Substance and Sexual Activity    Alcohol use: Yes     Comment: social    Drug use: Yes     Types: Marijuana (Weed)     Comment: every other week. - ast use one month ago     Family History:  No family history on file. REVIEW OF SYSTEMS:  Constitutional: Negative for fever and chills. HENT: Negative for congestion or drainage   Eyes: Negative for blurred vision and double vision. Respiratory: Negative for cough, shortness of breath and wheezing. Cardiovascular: Negative for chest pain and palpitations. Gastrointestinal: Negative for nausea. Negative for vomiting. Musculoskeletal: Positive for myalgias and joint pain. Skin: Negative for itching and rash. Neurological: Negative for dizziness, sensory change and headaches. Psychiatric/Behavioral: Negative for depression and suicidal ideas.       PHYSICAL EXAM:  Blood pressure (!) 148/105, pulse (!) 113, temperature 98.2 °F (36.8 °C), temperature source Tympanic, resp. rate 16, height 6' (1.829 m), weight 225 lb (102.1 kg), SpO2 95 %. Gen: alert and oriented, NAD, cooperative  Head: normocephalic atraumatic   Cardiovascular: Regular rate, no dependent edema, distal pulses 2+  Respiratory: Chest symmetric, no accessory muscle use, normal respirations  MSK: no swelling in legs; motor and sensation grossly intact     A/P: 45 y.o. male  was evaluated and after discussion surgical and non surgical options, the patient has decided to undergo revision L2/3 laminectomy and extension of lumbar interbody fusion to L2-3. Consent obtained and in chart. All questions answered appropriately. Surgical risks including but not limited to: bleeding, blood clots, infection, damage to surrounding tissues/nerves/vessels, failure of fixation, failure of wounds to heal, loss of motion, stiffness, dislocation, postoperative pain, recurrence of symptoms, need for future surgery, risks of anesthesia, loss of limb and loss of life were all discussed with the patient. Knowing these risks, the patient wishes to proceed with surgery. -Abx OCTOR  -Site marked, Consent in chart  -AC held  -NPO since midnight  -All question answered. ________________________________  Radha Domingo D.O.   Orthopedic Surgery Resident, PGY-1  [address]

## 2022-12-05 NOTE — ANESTHESIA PRE PROCEDURE
Department of Anesthesiology  Preprocedure Note       Name:  Santiago Coy   Age:  45 y.o.  :  1970                                          MRN:  [id number]         Date:  2022      Surgeon: Mary Russell):  Prema Tejada DO    Procedure: Procedure(s):  L2, L3 REVISION LUMBAR LAMINECTOMY WITH L2-3 EXTENSION OF POSTERIOR  LUMBAR  INTERBODY FUSION WITH MEDTRONIC AND VIVAGEN GRAFT FOR FUSION    Medications prior to admission:   Prior to Admission medications    Medication Sig Start Date End Date Taking? Authorizing Provider   glipiZIDE (GLUCOTROL) 10 MG tablet Take 10 mg by mouth 2 times daily (before meals)    Historical Provider, MD   buPROPion (WELLBUTRIN XL) 300 MG extended release tablet Take 300 mg by mouth every morning 450 mg daily    Historical Provider, MD   risperiDONE (RISPERDAL) 2 MG tablet Take 2 mg by mouth daily    Historical Provider, MD   cyclobenzaprine (FLEXERIL) 10 MG tablet Take 10 mg by mouth 3 times daily as needed for Muscle spasms    Historical Provider, MD   carBAMazepine (CARBATROL) 200 MG extended release capsule Take 200 mg by mouth 2 times daily 3 tabs am and pm    Historical Provider, MD   losartan (COZAAR) 100 MG tablet Take 100 mg by mouth daily    Historical Provider, MD   aspirin 81 MG EC tablet Take 81 mg by mouth daily    Historical Provider, MD   QUEtiapine (SEROQUEL) 400 MG tablet Take 400 mg by mouth nightly    Historical Provider, MD   liothyronine (CYTOMEL) 25 MCG tablet Take 25 mcg by mouth in the morning and at bedtime 1/2 tab    Historical Provider, MD   gabapentin (NEURONTIN) 600 MG tablet Take 600 mg by mouth 3 times daily.     Historical Provider, MD   dapagliflozin (FARXIGA) 10 MG tablet Take 10 mg by mouth every morning    Historical Provider, MD   metFORMIN (GLUCOPHAGE) 1000 MG tablet Take 1,000 mg by mouth 2 times daily (with meals)    Historical Provider, MD   exenatide (BYDUREON) 2 MG SRER injection Inject 2 mg into the skin once a week On Tuesday Historical Provider, MD   lamoTRIgine (LAMICTAL) 100 MG tablet Take 100 mg by mouth 3 times daily    Historical Provider, MD   atorvastatin (LIPITOR) 80 MG tablet Take 80 mg by mouth daily    Historical Provider, MD   clonazePAM (KLONOPIN) 2 MG tablet Take 2 mg by mouth nightly. Historical Provider, MD   cloNIDine (CATAPRES) 0.1 MG tablet Take 0.1 mg by mouth 2 times daily    Historical Provider, MD   levothyroxine (SYNTHROID) 100 MCG tablet Take 100 mcg by mouth in the morning and at bedtime 1/2 tab bid    Historical Provider, MD   rOPINIRole (REQUIP) 1 MG tablet Take 1 mg by mouth nightly    Historical Provider, MD       Current medications:    Current Facility-Administered Medications   Medication Dose Route Frequency Provider Last Rate Last Admin    ceFAZolin (ANCEF) 2,000 mg in sodium chloride 0.9 % 50 mL IVPB (mini-bag)  2,000 mg IntraVENous Once [employer] DO Tomas        0.9 % sodium chloride infusion   IntraVENous Continuous Pauline Hebert MD        lactated ringers infusion   IntraVENous Continuous Pauline Hebert MD        sodium chloride flush 0.9 % injection 5-40 mL  5-40 mL IntraVENous 2 times per day Pauline Hebert MD        sodium chloride flush 0.9 % injection 5-40 mL  5-40 mL IntraVENous PRN Pauline Hebert MD        0.9 % sodium chloride infusion   IntraVENous PRN Pauline Hebert MD           Allergies: Allergies   Allergen Reactions    Fentanyl Nausea And Vomiting     Violently vomits with patch       Problem List:  There is no problem list on file for this patient.       Past Medical History:        Diagnosis Date    Anxiety     [facility] [address]     Depression     Diabetes mellitus ([facility] [address]     Hyperlipidemia     Hypertension     PTSD (post-traumatic stress disorder)     Restless legs syndrome     Thyroid disease        Past Surgical History:        Procedure Laterality Date    BACK SURGERY      x3 lumbar fusion    COLONOSCOPY      KNEE ARTHROSCOPY Right     TONSILLECTOMY         Social History:    Social History     Tobacco Use    Smoking status: Never    Smokeless tobacco: Never   Substance Use Topics    Alcohol use: Yes     Comment: social                                Counseling given: Not Answered      Vital Signs (Current): There were no vitals filed for this visit. BP Readings from Last 3 Encounters:   10/31/22 (!) 156/96       NPO Status:                                                                                 BMI:   Wt Readings from Last 3 Encounters:   10/31/22 228 lb (103.4 kg)     There is no height or weight on file to calculate BMI.    CBC:   Lab Results   Component Value Date/Time    WBC 13.7 10/31/2022 03:33 PM    RBC 5.35 10/31/2022 03:33 PM    HGB 16.6 10/31/2022 03:33 PM    HCT 56.1 10/31/2022 03:33 PM    .9 10/31/2022 03:33 PM    RDW 13.9 10/31/2022 03:33 PM     10/31/2022 03:33 PM       CMP:   Lab Results   Component Value Date/Time     10/31/2022 03:33 PM    K 5.1 10/31/2022 03:33 PM     10/31/2022 03:33 PM    CO2 24 10/31/2022 03:33 PM    BUN 13 10/31/2022 03:33 PM    CREATININE 1.01 10/31/2022 03:33 PM    LABGLOM >60 10/31/2022 03:33 PM    GLUCOSE 67 10/31/2022 03:33 PM    CALCIUM 9.5 10/31/2022 03:33 PM       POC Tests: No results for input(s): POCGLU, POCNA, POCK, POCCL, POCBUN, POCHEMO, POCHCT in the last 72 hours.     Coags: No results found for: PROTIME, INR, APTT    HCG (If Applicable): No results found for: PREGTESTUR, PREGSERUM, HCG, HCGQUANT     ABGs: No results found for: PHART, PO2ART, GUL8BCN, TGD4XCJ, BEART, Y1YDNDEV     Type & Screen (If Applicable):  No results found for: LABABO, LABRH    Drug/Infectious Status (If Applicable):  No results found for: HIV, HEPCAB    COVID-19 Screening (If Applicable): No results found for: COVID19        Anesthesia Evaluation  Patient summary reviewed and Nursing notes reviewed no history of anesthetic complications:   Airway: Mallampati: II  TM distance: >3 FB   Neck ROM: full  Mouth opening: > = 3 FB   Dental: normal exam         Pulmonary:Negative Pulmonary ROS and normal exam  breath sounds clear to auscultation                             Cardiovascular:  Exercise tolerance: no interval change,   (+) hypertension: no interval change, hyperlipidemia      ECG reviewed  Rhythm: regular  Rate: normal                    Neuro/Psych:   (+) psychiatric history: stable with treatmentdepression/anxiety              ROS comment: Lumbar spondylosis GI/Hepatic/Renal: Neg GI/Hepatic/Renal ROS            Endo/Other:    (+) DiabetesType II DM, no interval change, , hypothyroidism::., .                 Abdominal:       Abdomen: soft. Vascular: negative vascular ROS. Other Findings:           Anesthesia Plan      general     ASA 2       Induction: intravenous. MIPS: Postoperative opioids intended and Prophylactic antiemetics administered. Anesthetic plan and risks discussed with patient. Use of blood products discussed with patient whom consented to blood products. Plan discussed with CRNA.                     Abhijeet Francois MD   12/5/2022

## 2022-12-05 NOTE — BRIEF OP NOTE
Brief Postoperative Note      Patient: Tami Burgos  YOB: 1970  MRN: [id number]    Date of Procedure: 12/5/2022    Pre-Op Diagnosis: Lumbar spondylosis [M47.816]    Post-Op Diagnosis: Same       Procedure(s):  L2 & L3 Lumbar Laminectomy  Extension of Lumbar Fusion L2-3    Surgeon(s):  Epifanio Clifton DO    Assistant:  Resident: Agustina Wilson DO    Anesthesia: General    Estimated Blood Loss (mL): 150    Fluids (mL): 4500 crystalloids    Urine Output (mL): 0698    Complications: None    Specimens:   * No specimens in log *    Implants:  Implant Name Type Inv. Item Serial No.  Lot No. LRB No. Used Action   GRAFT BNE SUB SM 1ML CRYOPRESERVED VIABLE XENIA CANC BNE - [id number]  GRAFT BNE SUB SM 1ML CRYOPRESERVED VIABLE XENIA CANC BNE [id number] [facility]-  N/A 1 Implanted   GRAFT BNE SUB SM 1ML CRYOPRESERVED VIABLE XENIA CANC BNE - [id number]  GRAFT BNE SUB SM 1ML CRYOPRESERVED VIABLE XENIA CANC BNE [id number] [facility]-  N/A 1 Implanted   GRAFT BNE SUB 5CC DEMIN BNE MTRX FRZ DRY PROGENIX + - [id number]  GRAFT BNE SUB 5CC DEMIN BNE MTRX FRZ DRY PROGENIX + [id number] [employer] SPINALGRAFT TECH-WD  N/A 1 Implanted   CONNECTOR SPNL DIA5. 5MM SIDE LD [facility] - [id number]  CONNECTOR SPNL DIA5. 5MM SIDE LD [facility]  MEDTRONIC SOFAMOR DANEK-WD  N/A 2 Implanted   SET SCR SPNL DIA6. 35MM STD TI - [id number]  SET SCR SPNL DIA6. 35MM STD TI  MEDTRONIC SOFAMOR DANEK-WD  N/A 4 Implanted   SCREW SPNL L40MM DIA6.5MM THORACOLUMBOSACRAL CO CHROM - [id number]  SCREW SPNL L40MM DIA6.5MM THORACOLUMBOSACRAL CO CHROM  MEDTRONIC SOFAMOR DANEK-WD  N/A 2 Implanted   SET SCR SPNL DIA4. 75MM TI BRK OFF [facility] - [id number]  SET SCR SPNL DIA4. 75MM TI BRK OFF [facility]  MEDTRONIC SOFAMOR DANEK-WD  N/A 2 Implanted   HELIO SPNL L55MM DIA4. 75MM CO [facility] - [id number]  HELIO SPNL L55MM DIA4. [address] [facility]  MEDTRONIC [employer] (QEEP)EK-[id number] N/A 1 Implanted   HELIO SPNL L60MM DIA4. 75MM CO CHROM [facility] - [id number]  HELIO SPNL L60MM DIA4. 75MM CO CHROM [facility]  MEDTRONIC SOFAMOR DANEK-WD  N/A 1 Implanted         Drains:   Closed/Suction Drain Right;Distal Back Accordion (Active)   Site Description Clean;Dry 12/05/22 1816   Dressing Status Clean, dry & intact 12/05/22 1816   Drainage Appearance Dark Red 12/05/22 1816   Drain Status Compressed 12/05/22 1816       Urinary Catheter 12/05/22 2 Way (Active)       Findings: See Op Note    Electronically signed by Vilma Levine DO on 12/5/2022 at 6:29 PM

## 2022-12-06 LAB
ABSOLUTE EOS #: 0 K/UL (ref 0–0.4)
ABSOLUTE LYMPH #: 0.6 K/UL (ref 1–4.8)
ABSOLUTE MONO #: 0.8 K/UL (ref 0.1–1.2)
ANION GAP SERPL CALCULATED.3IONS-SCNC: 12 MMOL/L (ref 9–17)
BASOPHILS # BLD: 1 % (ref 0–2)
BASOPHILS ABSOLUTE: 0.1 K/UL (ref 0–0.2)
BUN BLDV-MCNC: 14 MG/DL (ref 6–20)
CALCIUM SERPL-MCNC: 8.3 MG/DL (ref 8.6–10.4)
CHLORIDE BLD-SCNC: 111 MMOL/L (ref 98–107)
CO2: 17 MMOL/L (ref 20–31)
CREAT SERPL-MCNC: 1.02 MG/DL (ref 0.7–1.2)
EOSINOPHILS RELATIVE PERCENT: 0 % (ref 1–4)
GFR SERPL CREATININE-BSD FRML MDRD: >60 ML/MIN/1.73M2
GLUCOSE BLD-MCNC: 184 MG/DL (ref 70–99)
HCT VFR BLD CALC: 41.1 % (ref 41–53)
HEMOGLOBIN: 13.6 G/DL (ref 13.5–17.5)
LYMPHOCYTES # BLD: 5 % (ref 24–44)
MCH RBC QN AUTO: 30.2 PG (ref 26–34)
MCHC RBC AUTO-ENTMCNC: 33 G/DL (ref 31–37)
MCV RBC AUTO: 91.4 FL (ref 80–100)
MONOCYTES # BLD: 6 % (ref 2–11)
PDW BLD-RTO: 13.8 % (ref 12.5–15.4)
PLATELET # BLD: 194 K/UL (ref 140–450)
PMV BLD AUTO: 9.3 FL (ref 6–12)
POTASSIUM SERPL-SCNC: 4.5 MMOL/L (ref 3.7–5.3)
RBC # BLD: 4.5 M/UL (ref 4.5–5.9)
SEG NEUTROPHILS: 88 % (ref 36–66)
SEGMENTED NEUTROPHILS ABSOLUTE COUNT: 10.8 K/UL (ref 1.8–7.7)
SODIUM BLD-SCNC: 140 MMOL/L (ref 135–144)
WBC # BLD: 12.2 K/UL (ref 3.5–11)

## 2022-12-06 PROCEDURE — 6370000000 HC RX 637 (ALT 250 FOR IP): Performed by: NURSE PRACTITIONER

## 2022-12-06 PROCEDURE — 2060000000 HC ICU INTERMEDIATE R&B

## 2022-12-06 PROCEDURE — 97166 OT EVAL MOD COMPLEX 45 MIN: CPT

## 2022-12-06 PROCEDURE — 94664 DEMO&/EVAL PT USE INHALER: CPT

## 2022-12-06 PROCEDURE — 6370000000 HC RX 637 (ALT 250 FOR IP): Performed by: STUDENT IN AN ORGANIZED HEALTH CARE EDUCATION/TRAINING PROGRAM

## 2022-12-06 PROCEDURE — 97535 SELF CARE MNGMENT TRAINING: CPT

## 2022-12-06 PROCEDURE — 6360000002 HC RX W HCPCS: Performed by: STUDENT IN AN ORGANIZED HEALTH CARE EDUCATION/TRAINING PROGRAM

## 2022-12-06 PROCEDURE — 36415 COLL VENOUS BLD VENIPUNCTURE: CPT

## 2022-12-06 PROCEDURE — 97116 GAIT TRAINING THERAPY: CPT

## 2022-12-06 PROCEDURE — 99219 PR INITIAL OBSERVATION CARE/DAY 50 MINUTES: CPT | Performed by: HOSPITALIST

## 2022-12-06 PROCEDURE — 80048 BASIC METABOLIC PNL TOTAL CA: CPT

## 2022-12-06 PROCEDURE — 2580000003 HC RX 258: Performed by: STUDENT IN AN ORGANIZED HEALTH CARE EDUCATION/TRAINING PROGRAM

## 2022-12-06 PROCEDURE — 85025 COMPLETE CBC W/AUTO DIFF WBC: CPT

## 2022-12-06 PROCEDURE — 97162 PT EVAL MOD COMPLEX 30 MIN: CPT

## 2022-12-06 RX ORDER — CARBAMAZEPINE 200 MG/1
600 TABLET ORAL 2 TIMES DAILY
Status: DISCONTINUED | OUTPATIENT
Start: 2022-12-06 | End: 2022-12-07 | Stop reason: RX

## 2022-12-06 RX ORDER — GABAPENTIN 300 MG/1
600 CAPSULE ORAL NIGHTLY
Status: DISCONTINUED | OUTPATIENT
Start: 2022-12-06 | End: 2022-12-07 | Stop reason: HOSPADM

## 2022-12-06 RX ADMIN — METFORMIN HYDROCHLORIDE 1000 MG: 500 TABLET ORAL at 17:40

## 2022-12-06 RX ADMIN — LEVOTHYROXINE SODIUM 50 MCG: 50 TABLET ORAL at 08:27

## 2022-12-06 RX ADMIN — DEXAMETHASONE SODIUM PHOSPHATE 6 MG: 4 INJECTION, SOLUTION INTRAMUSCULAR; INTRAVENOUS at 03:35

## 2022-12-06 RX ADMIN — OXYCODONE HYDROCHLORIDE 10 MG: 5 TABLET ORAL at 14:15

## 2022-12-06 RX ADMIN — CYCLOBENZAPRINE 10 MG: 10 TABLET, FILM COATED ORAL at 20:59

## 2022-12-06 RX ADMIN — LAMOTRIGINE 100 MG: 100 TABLET ORAL at 08:27

## 2022-12-06 RX ADMIN — LAMOTRIGINE 100 MG: 100 TABLET ORAL at 14:15

## 2022-12-06 RX ADMIN — QUETIAPINE FUMARATE 400 MG: 100 TABLET ORAL at 21:00

## 2022-12-06 RX ADMIN — RISPERIDONE 2 MG: 1 TABLET ORAL at 20:59

## 2022-12-06 RX ADMIN — CARBAMAZEPINE 600 MG: 200 TABLET ORAL at 20:57

## 2022-12-06 RX ADMIN — LOSARTAN POTASSIUM 100 MG: 50 TABLET, FILM COATED ORAL at 08:27

## 2022-12-06 RX ADMIN — ATORVASTATIN CALCIUM 80 MG: 40 TABLET, FILM COATED ORAL at 08:27

## 2022-12-06 RX ADMIN — GABAPENTIN 600 MG: 300 CAPSULE ORAL at 21:01

## 2022-12-06 RX ADMIN — ACETAMINOPHEN 1000 MG: 500 TABLET ORAL at 17:40

## 2022-12-06 RX ADMIN — BUPROPION HYDROCHLORIDE 300 MG: 150 TABLET, EXTENDED RELEASE ORAL at 08:27

## 2022-12-06 RX ADMIN — ROPINIROLE HYDROCHLORIDE 1 MG: 1 TABLET, FILM COATED ORAL at 21:01

## 2022-12-06 RX ADMIN — GLIPIZIDE 10 MG: 5 TABLET ORAL at 17:40

## 2022-12-06 RX ADMIN — ACETAMINOPHEN 1000 MG: 500 TABLET ORAL at 21:00

## 2022-12-06 RX ADMIN — DEXAMETHASONE SODIUM PHOSPHATE 6 MG: 4 INJECTION, SOLUTION INTRAMUSCULAR; INTRAVENOUS at 17:40

## 2022-12-06 RX ADMIN — CLONAZEPAM 2 MG: 1 TABLET ORAL at 20:59

## 2022-12-06 RX ADMIN — LAMOTRIGINE 100 MG: 100 TABLET ORAL at 21:01

## 2022-12-06 RX ADMIN — BISACODYL 5 MG: 5 TABLET, COATED ORAL at 08:27

## 2022-12-06 RX ADMIN — SENNOSIDES AND DOCUSATE SODIUM 1 TABLET: 50; 8.6 TABLET ORAL at 21:00

## 2022-12-06 RX ADMIN — SENNOSIDES AND DOCUSATE SODIUM 1 TABLET: 50; 8.6 TABLET ORAL at 08:28

## 2022-12-06 RX ADMIN — HYDROMORPHONE HYDROCHLORIDE 0.5 MG: 1 INJECTION, SOLUTION INTRAMUSCULAR; INTRAVENOUS; SUBCUTANEOUS at 09:38

## 2022-12-06 RX ADMIN — ACETAMINOPHEN 1000 MG: 500 TABLET ORAL at 09:37

## 2022-12-06 RX ADMIN — DEXAMETHASONE SODIUM PHOSPHATE 6 MG: 4 INJECTION, SOLUTION INTRAMUSCULAR; INTRAVENOUS at 09:37

## 2022-12-06 RX ADMIN — SODIUM CHLORIDE, PRESERVATIVE FREE 10 ML: 5 INJECTION INTRAVENOUS at 21:49

## 2022-12-06 RX ADMIN — ACETAMINOPHEN 1000 MG: 500 TABLET ORAL at 03:35

## 2022-12-06 RX ADMIN — LIOTHYRONINE SODIUM 12.5 MCG: 25 TABLET ORAL at 21:01

## 2022-12-06 RX ADMIN — FAMOTIDINE 20 MG: 20 TABLET ORAL at 21:01

## 2022-12-06 RX ADMIN — LEVOTHYROXINE SODIUM 50 MCG: 50 TABLET ORAL at 21:01

## 2022-12-06 RX ADMIN — CLONIDINE HYDROCHLORIDE 0.1 MG: 0.1 TABLET ORAL at 21:01

## 2022-12-06 RX ADMIN — GLIPIZIDE 10 MG: 5 TABLET ORAL at 08:30

## 2022-12-06 RX ADMIN — OXYCODONE HYDROCHLORIDE 10 MG: 5 TABLET ORAL at 20:58

## 2022-12-06 RX ADMIN — METFORMIN HYDROCHLORIDE 1000 MG: 500 TABLET ORAL at 08:28

## 2022-12-06 RX ADMIN — OXYCODONE HYDROCHLORIDE 10 MG: 5 TABLET ORAL at 18:16

## 2022-12-06 RX ADMIN — OXYCODONE HYDROCHLORIDE 10 MG: 5 TABLET ORAL at 08:27

## 2022-12-06 RX ADMIN — CLONIDINE HYDROCHLORIDE 0.1 MG: 0.1 TABLET ORAL at 08:27

## 2022-12-06 RX ADMIN — FAMOTIDINE 20 MG: 20 TABLET ORAL at 08:28

## 2022-12-06 ASSESSMENT — PAIN SCALES - GENERAL
PAINLEVEL_OUTOF10: 8
PAINLEVEL_OUTOF10: 8
PAINLEVEL_OUTOF10: 10
PAINLEVEL_OUTOF10: 3
PAINLEVEL_OUTOF10: 6
PAINLEVEL_OUTOF10: 8
PAINLEVEL_OUTOF10: 5

## 2022-12-06 ASSESSMENT — PAIN DESCRIPTION - ORIENTATION: ORIENTATION: LEFT

## 2022-12-06 ASSESSMENT — PAIN DESCRIPTION - DESCRIPTORS: DESCRIPTORS: ACHING;THROBBING

## 2022-12-06 ASSESSMENT — PAIN DESCRIPTION - LOCATION: LOCATION: HIP

## 2022-12-06 ASSESSMENT — PAIN - FUNCTIONAL ASSESSMENT: PAIN_FUNCTIONAL_ASSESSMENT: PREVENTS OR INTERFERES SOME ACTIVE ACTIVITIES AND ADLS

## 2022-12-06 NOTE — ANESTHESIA POSTPROCEDURE EVALUATION
Department of Anesthesiology  Postprocedure Note    Patient: Grzegorz Denton  MRN: [id number]  YOB: 1970  Date of evaluation: 12/5/2022      Procedure Summary     Date: 12/05/22 Room / Location: [facility] OR [facility]    Anesthesia Start: 1569 Anesthesia Stop: 3109    Procedure: L2, L3 REVISION LUMBAR LAMINECTOMY WITH L2-3 EXTENSION OF POSTERIOR  LUMBAR  FUSION WITH MEDTRONIC AND VIVAGEN GRAFT FOR FUSION (Spine Lumbar) Diagnosis:       Lumbar spondylosis      (Lumbar spondylosis [X43.113])    Surgeons: Jose Perkins DO Responsible Provider: Zenaida Souza MD    Anesthesia Type: general ASA Status: 2          Anesthesia Type: No value filed.     Long Phase I: Long Score: 8    Long Phase II:        Anesthesia Post Evaluation    Patient location during evaluation: PACU  Patient participation: complete - patient participated  Level of consciousness: awake and alert  Airway patency: patent  Nausea & Vomiting: no vomiting and nausea  Complications: no  Cardiovascular status: blood pressure returned to baseline  Respiratory status: acceptable and room air  Hydration status: euvolemic

## 2022-12-06 NOTE — PROGRESS NOTES
Progress Note    Patient:  Tonja Steiner  YOB: 1970     45 y.o. male    Subjective:  Patient seen and examined at bedside this morning. No new complaints or concerns per patient this morning. No acute issues overnight per nursing. Pain well-controlled, first does of pain meds since surgery/PACU administered just before arrival for rounds. Denies: fever/chills, HA, CP, SOB, N/V, dysuria, or numbness/tingling in extremities. -BM/+flatus. PT and OT evaluations today. Patient states pre-op leg pain gone, but having surgical back pain. Also noted to have significant left periorbital swelling. Discussed likely due to positioning for surgery, will have anesthesia evaluate. Objective:   Vitals:    12/06/22 0830   BP: (!) 161/95   Pulse: (!) 110   Resp: 16   Temp: 98.2 °F (36.8 °C)   SpO2: 96%     Gen: NAD, cooperative, lying comfortably supine in bed  Back: Appropriate post-op TTP. Dressing c/d/i. Hemovac drain in place with serosanguinous output. LE  L psoas 5/5, R psoas 5/5  L quads 5/5, R quads 5/5  L TA 5/5, R TA 5/5  L EHL 5/5, R EHL 5/5  L GSC 5/5, R GSC 5/5  Sensation intact to light touch L2-S1 bilaterally  No clonus    Recent Labs     12/06/22  0550   WBC 12.2*   HGB 13.6   HCT 41.1         K 4.5   BUN 14   CREATININE 1.02   GLUCOSE 184*       Meds:   See rec for complete list    Impression:    Plan: 45 y.o. male s/p laminectomy L2 & L3 and extension of fusion L2-3, POD#1      -Apply cold to left eye. Inform and request anesthesia evaluation regarding eye swelling. -AAT. Avoid excessive Bending, Lifting, and Twisting (BLT).   -Complete post op Ancef  -Post op Hb 13.6  -Pain control: sched Tylenol, PO Malena prn, and IV Dilaudid for breakthrough; avoid NSAIDs  -Tolerating PO intake well  -Voiding well  -Ice (20 min, 1 hour off) for edema/pain control  -Encourage IS and deep breathing  -DVT ppx: encourage being up and OOB & EPC, no chemical AC until POD#5  -PT/OT  -Please page Ortho with any questions    Tyrone Moore DO  Orthopedic Spine Surgery  8:50 AM 12/6/2022

## 2022-12-06 NOTE — PROGRESS NOTES
Restrictions  Restrictions/Precautions  Restrictions/Precautions: Surgical protocol, General Precautions  Required Braces or Orthoses?: No  Position Activity Restriction  Spinal Precautions: No Bending, No Lifting, No Twisting     Subjective   General  Patient assessed for rehabilitation services?: Yes  Family / Caregiver Present: Yes  Referring Practitioner: Vince López  Referral Date : 12/06/22  Diagnosis: s/p lumbar fusion  Follows Commands: Within Functional Limits  Subjective  Subjective: Pt supine in bed and agreeable to therapy. Pt reports pain 8/10 in low back; none in LE's. Social/Functional History  Social/Functional History  Lives With: Spouse  Type of Home: House  Home Layout: Two level, Able to Live on Main level with bedroom/bathroom  Home Access: Stairs to enter with rails  Entrance Stairs - Number of Steps: 3  Entrance Stairs - Rails: Both  Bathroom Shower/Tub: Tub/Shower unit  Bathroom Toilet: Handicap height  Bathroom Equipment: Shower chair  Home Equipment: [place] Show, rolling, Cane  Has the patient had two or more falls in the past year or any fall with injury in the past year?: No  Receives Help From: Family, Friend(s)  ADL Assistance: [address] [place] [address] Avenue: Independent  Homemaking Responsibilities: Yes  Ambulation Assistance: Independent (Juan José kapoor)  Transfer Assistance: Independent  Active : Yes  Vision/Hearing  Vision  Vision: Impaired  Vision Exceptions: Wears glasses for reading  Hearing  Hearing: Within functional limits    Cognition   Orientation  Overall Orientation Status: Within Normal Limits  Orientation Level: Oriented X4  Cognition  Overall Cognitive Status: WNL     Objective      Gross Assessment  AROM: Within functional limits  PROM: Within functional limits  Strength:  Within functional limits  Coordination: Within functional limits  Tone: Normal  Sensation: Intact     AROM RLE (degrees)  RLE AROM: WFL  AROM LLE (degrees)  LLE AROM : WFL  Strength RLE  Strength RLE: Exception  Comment: Grossly 4+/5 except did not MMT hip flexion  Strength LLE  Strength LLE: Exception  Comment: Grossly 4+/5 except did not MMT hip flexion           Bed mobility  Supine to Sit: Contact guard assistance  Scooting: Contact guard assistance  Bed Mobility Comments: Head of bed relatively flat with use of bed rail; increased time and effort with education of log rolling technique. Pt retired to chair at end of session. Transfers  Sit to Stand: Contact guard assistance  Stand to Sit: Contact guard assistance  Stand Pivot Transfers: Contact guard assistance  Comment: Transfers with RW; increased time and effort with sit <-> stand, verbal cues for hand placement. Ambulation  Surface: Level tile  Device: Rolling Walker  Assistance: Contact guard assistance  Quality of Gait: slow pace and guarded; decreased step length and height initially which improved slightly as gait progressed  Gait Deviations: Slow Stephanie;Decreased step length;Decreased step height  Distance: 30' x 1  Comments: Pt noted increased back pain toward end of ambulation; nursing notified for pain control. More Ambulation?: No  Stairs/Curb  Stairs?: No     Balance  Posture: Good  Sitting - Static: Good  Sitting - Dynamic: Good;-  Standing - Static: Fair  Standing - Dynamic: Fair  Comments: standing balance assessed with RW           OutComes Score                                                  AM-PAC Score  AM-PAC Inpatient Mobility Raw Score : 17 (12/06/22 1603)  AM-PAC Inpatient T-Scale Score : 42.13 (12/06/22 1603)  Mobility Inpatient CMS 0-100% Score: 50.57 (12/06/22 1603)  Mobility Inpatient CMS G-Code Modifier : CK (12/06/22 1603)          Goals  Short Term Goals  Time Frame for Short Term Goals: 14 visits  Short Term Goal 1: Pt to transfer Independently without LOB. Short Term Goal 2: Pt to ambulate with least restrictive device/no device 150' Modified (I) without LOB.   Short Term Goal 3: Pt to negotiate 3-4 steps with (B) rails Modified (I) without LOB for entry into home. Patient Goals   Patient Goals : Return home       Education  Patient Education  Education Given To: Patient; Family  Education Provided: Role of Therapy;Plan of Care;Transfer Training;Precautions  Education Provided Comments: importance of mobility, surgical precautions  Education Method: Demonstration;Verbal  Barriers to Learning: None  Education Outcome: Verbalized understanding;Demonstrated understanding      Therapy Time   Individual Concurrent Group Co-treatment   Time In 0901         Time Out 0936         Minutes 35         Timed Code Treatment Minutes: Vandana 224, PT

## 2022-12-06 NOTE — PROGRESS NOTES
Occupational Therapy  Facility/Department: [facility]  Occupational Therapy Initial Assessment      Name: Katia Yanez  : 1970  MRN: [id number]  Date of Service: 2022    Discharge Recommendations:  Patient would benefit from continued therapy after discharge  OT Equipment Recommendations  Other: Grab bars toilet. Grab bars shower, Handheld showerhead. TBD / Will continue to make AD and DME recommendations. Patient Diagnosis(es): There were no encounter diagnoses. Past Medical History:  has a past medical history of Anxiety, Bipolar 1 disorder ([facility] [address] Depression, Diabetes mellitus ([facility] [address] Hyperlipidemia, Hypertension, PTSD (post-traumatic stress disorder), Restless legs syndrome, and Thyroid disease. Past Surgical History:  has a past surgical history that includes back surgery; Colonoscopy; Knee arthroscopy (Right); Tonsillectomy; lumbar laminectomy (2022); and lumbar fusion (N/A, 2022). Assessment   Performance deficits / Impairments: Decreased endurance;Decreased functional mobility ; Decreased ADL status; Decreased balance;Decreased posture;Decreased safe awareness;Decreased high-level IADLs  Assessment: Pt is very motivated to regain functional independence with daily tasks and overall endurance. Pt currently has deficits / impairments which impact his ability to return to prior level of functioning; will benefit from continued participation in OT services to improve independent skills / functions. Prognosis: Good  Decision Making: Medium Complexity  REQUIRES OT FOLLOW-UP: Yes  Activity Tolerance  Activity Tolerance: Patient Tolerated treatment well;Patient limited by fatigue;Patient limited by pain          Plan   Occupational Therapy Plan  Times Per Week: 5-6x/week  (L Fusion)  Times Per Day:  Once a day  Current Treatment Recommendations: Balance training, ROM, Functional mobility training, Endurance training, Neuromuscular re-education, Equipment evaluation, education, & procurement, Patient/Caregiver education & training, Safety education & training, Pain management, Self-Care / ADL, Home management training       Restrictions  Restrictions/Precautions  Restrictions/Precautions: Surgical protocol, General Precautions  Required Braces or Orthoses?: No  Position Activity Restriction  Spinal Precautions: No Bending, No Lifting, No Twisting  Spinal Precautions: No excessive BLT  Other position/activity restrictions: Up w/ Assist      Subjective   General  Patient assessed for rehabilitation services?: Yes  Family / Caregiver Present: Yes (Pt's wife)  Diagnosis: S/P Laminectomy L2 & L3 and Extension of Fusion L2-3 (12/5/2022 c Dr. Rox Vazquez). Subjective  Subjective: RN approved Pt to be seen for OT Evaluatiion. General Comment  Comments: Pt was agreeable // cooperative throughout. Pt and wife were very motivated and receptive to movement, activity, and spinal precautions. Social/Functional History  Social/Functional History  Lives With: Spouse  Type of Home: House  Home Layout: Two level, Able to Live on Main level with bedroom/bathroom (2 story, Pt stays on 1st floor, does not access 2nd floor)  Home Access: Stairs to enter with rails (Always uses the same entrance)  Entrance Stairs - Number of Steps: 3  Entrance Stairs - Rails: Both  Bathroom Shower/Tub: Tub/Shower unit  Bathroom Toilet: Handicap height  Bathroom Equipment: Shower chair  Bathroom Accessibility: Accessible  Home Equipment: Walker, rolling, Cane (Pt reports he has Long-handled AE (reacher, sock aid, sponge) from prior Sx at home.   Pt's wife will bring these in to consecutive sessions for additonal practice and training s/p Sx.)  Has the patient had two or more falls in the past year or any fall with injury in the past year?: No  Receives Help From: Family, Friend(s)  ADL Assistance: [facility]: Independent  Homemaking Responsibilities: Yes  Ambulation Assistance: Independent (Cane occasionally)  Transfer Assistance: Independent  Active : Yes      Objective   Safety Devices  Type of Devices: Gait belt; Chair alarm in place;Call light within reach;Nurse notified; Left in chair  Restraints  Restraints Initially in Place: No    Bed Mobility Training  Bed Mobility Training: Yes  Overall Level of Assistance: Contact-guard assistance; Additional time; Adaptive equipment (HOB flat, Bedrail (Left). Performed after max education // review of spinal precautions and Log Rolling technique to maintain Spinal Precautions.)  Interventions: Visual cues; Verbal cues; Tactile cues (Mod Cues for accurate technique // maintenance of Spinal Precautions (integrating Log roll technique))  Rolling: Contact-guard assistance; Additional time; Adaptive equipment  Supine to Sit: Adaptive equipment; Additional time;Contact-guard assistance  Scooting: Adaptive equipment; Additional time;Contact-guard assistance    Transfer Training  Transfer Training: Yes  Overall Level of Assistance: Contact-guard assistance; Additional time; Adaptive equipment (Using RW. CGA for all t/f and mobility. Increased time // effort for completion. Performed after max education // review of spinal precautions.)  Interventions: Visual cues; Verbal cues; Tactile cues; Safety awareness training (Mod Cues for safe and accurate use of DME // integration of Ax pacing // maintenance of Spinal precautions)  Sit to Stand: Contact-guard assistance; Additional time; Adaptive equipment  Stand to Sit: Adaptive equipment; Additional time;Contact-guard assistance  Stand Pivot Transfers: Contact-guard assistance; Additional time; Adaptive equipment  Bed to Chair: Contact-guard assistance; Additional time; Adaptive equipment  Toilet Transfer:  (Did not complete this date. Elevated BSC was added (by Nursing, per OT request) to Pt's toilet. Encouraged pt to begin toilet t/f after removal of brandon cath.   Will continue with training for toilet / bathroom t/f functional limits    Cognition  Overall Cognitive Status: WNL  Orientation  Overall Orientation Status: Within Normal Limits  Orientation Level: Oriented X4    Education Given To: Patient; Family  Education Provided: Role of Therapy;Plan of Care;Family Education;Precautions; Equipment;ADL Adaptive Strategies;Transfer Training; Fall Prevention Strategies; Energy Conservation  Education Provided Comments: Spinal Precautions, Maintenance of Spinal Precautions during functional tasks and mobility / transitional movements. Education Method: Demonstration;Verbal  Barriers to Learning: None  Education Outcome: Verbalized understanding;Continued education needed;Demonstrated understanding      AM-PAC Score  AM-PAC Inpatient Daily Activity Raw Score: 13 (12/06/22 1833)  AM-PAC Inpatient ADL T-Scale Score : 32.03 (12/06/22 1833)  ADL Inpatient CMS 0-100% Score: 63.03 (12/06/22 1833)  ADL Inpatient CMS G-Code Modifier : CL (12/06/22 1833)      Goals  Short Term Goals  Time Frame for Short Term Goals: Within 14 tx sessions -  Short Term Goal 1: Pt will verbalize // demo Mod I and Good Maintenance of Spinal Precautions during all functional tasks // transfers. Short Term Goal 2: Pt will demo Mod I and Good Integration of AE // DME during UB and LB ADLs. Short Term Goal 3: Pt will participate in Bathroom and ADL Transfers with Mod I without LOB. Short Term Goal 4: Pt will maintain Dynamic Standing Balance (8-10 mins) during functional tasks (Lumbar pain less than 4/10). Short Term Goal 5: Pt will participate in Functional Mobility (in-room // in-home negotiation) with Supervision assist with Fair+ Integration of EC // Ax pacing.        Therapy Time   Individual Concurrent Group Co-treatment   Time In 0901         Time Out 0936         Minutes 35         Timed Code Treatment Minutes: 10 Minutes (ADL)       ROMAN Rose, OTR/L

## 2022-12-06 NOTE — CONSULTS
[facility]  Office: [address], DO, Yana Amrit, DO, Júnior Phlegm, DO, Grecia Smith Blood, DO, Sindi Marmolejo MD, Gi Gambino MD, Audrey Kauffman MD, Nolvia Pal MD,  Helga Deluca MD, Jesus Machuca MD, Jossie Pineda, DO, Inessa Lindo MD,  Natalie Gibbs MD, Vadim Hernandez MD, Esther Granado, DO, George Wiley MD, Alisa Jose MD, Maxime Paiz, DO, Víctor Blanco MD, Asmita Randhawa MD, Nakita Ortiz MD, Donita Melendez MD, Lilly Bustamante DO, Marilyn Mohr MD, Harinder Gerardo MD, Jina Erazo, CNP,  Lexie Mcarthur, CNP, Randi Roa, CNP, Rahat Vargas, CNP,  Prabhu Elder, DNP, Alphonso Williamson, CNP, Cindy Velez, CNP, Flor Cortes, CNP, Margi Toscano, CNP, Antony Libman, CNP, Cathleen Staples PA-C, Celestina Licea, CNS, Lilibeth Dougherty, CNP, Mel Marcelo, CNP         [address] / HISTORY AND PHYSICAL EXAMINATION            Date:   12/6/2022  Patient name:  Abhijit Bennett  Date of admission:  12/5/2022 10:28 AM  MRN:   [id number]  Account:  [de-identified]  YOB: 1970  PCP:    Raman Alvarenga MD  Room:   [facility]/746-59  Code Status:    Full Code    Physician Requesting Consult: Reymundo Cormier DO    Reason for Consult: Diabetic, hypertensive, hyperlipidemia management    Chief Complaint:     \" My back is sore\"    History Obtained From:     patient, spouse, electronic medical record    History of Present Illness: This very pleasant 78-year-old male with degenerative disc disease underwent lumbar fusion. He is postop day #1 and doing quite well. His pain is under good control. Medicine has been consulted for diabetic, hypertensive, general medical management. The patient is on an SGLT and unfortunately did not hold it 3 days prior to surgery as per general recommendations however fortunately he has not developed euvolemic ketoacidosis.   I would recommend holding it at least until tomorrow and resuming it tomorrow. He did at least hold it 24 hours prior to surgery and I did have a long discussion with patient as well as his wife regarding current recommendations regarding this particular class of drug is to hold it for 3 days prior to any surgical intervention. They appreciate the information denies any questions or concerns at this point in time. Past Medical History:     Past Medical History:   Diagnosis Date    Anxiety     Bipolar 1 disorder ([facility] [address]     Depression     Diabetes mellitus ([facility] [address]     Hyperlipidemia     Hypertension     PTSD (post-traumatic stress disorder)     Restless legs syndrome     Thyroid disease         Past Surgical History:     Past Surgical History:   Procedure Laterality Date    BACK SURGERY      x3 lumbar fusion    COLONOSCOPY      KNEE ARTHROSCOPY Right     LUMBAR LAMINECTOMY  12/05/2022    L2, L3 REVISION LUMBAR LAMINECTOMY WITH L2-3 EXTENSION OF POSTERIOR  LUMBAR  FUSION WITH MEDTRONIC AND VIVAGEN GRAFT FOR FUSION (    TONSILLECTOMY          Medications Prior to Admission:     Prior to Admission medications    Medication Sig Start Date End Date Taking?  Authorizing Provider   glipiZIDE (GLUCOTROL) 10 MG tablet Take 10 mg by mouth 2 times daily (before meals)    Historical Provider, MD   buPROPion (WELLBUTRIN XL) 300 MG extended release tablet Take 300 mg by mouth every morning 450 mg daily    Historical Provider, MD   risperiDONE (RISPERDAL) 2 MG tablet Take 2 mg by mouth daily    Historical Provider, MD   cyclobenzaprine (FLEXERIL) 10 MG tablet Take 10 mg by mouth 3 times daily as needed for Muscle spasms    Historical Provider, MD   carBAMazepine (CARBATROL) 200 MG extended release capsule Take 200 mg by mouth 2 times daily 3 tabs am and pm    Historical Provider, MD   losartan (COZAAR) 100 MG tablet Take 100 mg by mouth daily    Historical Provider, MD   aspirin 81 MG EC tablet Take 81 mg by mouth daily    Historical Provider, MD QUEtiapine (SEROQUEL) 400 MG tablet Take 400 mg by mouth nightly    Historical Provider, MD   liothyronine (CYTOMEL) 25 MCG tablet Take 25 mcg by mouth in the morning and at bedtime 1/2 tab    Historical Provider, MD   gabapentin (NEURONTIN) 600 MG tablet Take 600 mg by mouth 3 times daily. Historical Provider, MD   dapagliflozin (FARXIGA) 10 MG tablet Take 10 mg by mouth every morning    Historical Provider, MD   metFORMIN (GLUCOPHAGE) 1000 MG tablet Take 1,000 mg by mouth 2 times daily (with meals)    Historical Provider, MD   exenatide (BYDUREON) 2 MG SRER injection Inject 2 mg into the skin once a week On Tuesday    Historical Provider, MD   lamoTRIgine (LAMICTAL) 100 MG tablet Take 100 mg by mouth 3 times daily    Historical Provider, MD   atorvastatin (LIPITOR) 80 MG tablet Take 80 mg by mouth daily    Historical Provider, MD   clonazePAM (KLONOPIN) 2 MG tablet Take 2 mg by mouth nightly. Historical Provider, MD   cloNIDine (CATAPRES) 0.1 MG tablet Take 0.1 mg by mouth 2 times daily    Historical Provider, MD   levothyroxine (SYNTHROID) 100 MCG tablet Take 100 mcg by mouth in the morning and at bedtime 1/2 tab bid    Historical Provider, MD   rOPINIRole (REQUIP) 1 MG tablet Take 1 mg by mouth nightly    Historical Provider, MD        Allergies:     Fentanyl    Social History:     Tobacco:    reports that he has never smoked. He has never used smokeless tobacco.  Alcohol:      reports current alcohol use. Drug Use:  reports current drug use. Drug: Marijuana Nan Eric). Family History:     History reviewed. No pertinent family history. Review of Systems:     Positive and Negative as described in HPI. CONSTITUTIONAL:  negative for fevers, chills, sweats, fatigue, weight loss  HEENT:  negative for vision, hearing changes, runny nose, throat pain  RESPIRATORY:  negative for shortness of breath, cough, congestion, wheezing. CARDIOVASCULAR:  negative for chest pain, palpitations.   GASTROINTESTINAL: negative for nausea, vomiting, diarrhea, constipation, change in bowel habits, abdominal pain   GENITOURINARY:  negative for difficulty of urination, burning with urination, frequency   INTEGUMENT:  negative for rash, skin lesions, easy bruising   HEMATOLOGIC/LYMPHATIC: Positive for left periorbital swelling following surgery  ALLERGIC/IMMUNOLOGIC:  negative for urticaria , itching  ENDOCRINE:  negative increase in drinking, increase in urination, hot or cold intolerance  MUSCULOSKELETAL: Positive for postoperative back pain  NEUROLOGICAL:  negative for headaches, dizziness, lightheadedness, numbness, pain, tingling extremities  BEHAVIOR/PSYCH:  negative for depression, anxiety    Physical Exam:     /64   Pulse (!) 116   Temp 98.4 °F (36.9 °C) (Oral)   Resp 17   Ht 6' (1.829 m)   Wt 225 lb (102.1 kg)   SpO2 93%   BMI 30.52 kg/m²   Temp (24hrs), Av.1 °F (36.7 °C), Min:97.6 °F (36.4 °C), Max:98.6 °F (37 °C)    Recent Labs     22  1122 22  1836   POCGLU 167* 177*       Intake/Output Summary (Last 24 hours) at 2022 1447  Last data filed at 2022 1417  Gross per 24 hour   Intake 3300 ml   Output 6895 ml   Net -3595 ml       General Appearance:  alert, well appearing, and in no acute distress  Mental status: oriented to person, place, and time with normal affect  Head:  normocephalic, atraumatic. Eye: no icterus, redness, pupils equal and reactive, extraocular eye movements intact, conjunctiva clear, slight periorbital fullness noted on the left  Ear: normal external ear, no discharge, hearing intact  Nose:  no drainage noted  Mouth: mucous membranes moist  Neck: supple, no carotid bruits, thyroid not palpable  Lungs: Bilateral equal air entry, clear to ausculation, no wheezing, rales or rhonchi, normal effort  Cardiovascular: normal rate, regular rhythm, no murmur, gallop, rub.   Abdomen: Soft, nontender, nondistended, normal bowel sounds, no hepatomegaly or splenomegaly  Neurologic: There are no new focal motor or sensory deficits, normal muscle tone and bulk, no abnormal sensation, normal speech, cranial nerves II through XII grossly intact  Skin: No gross lesions, rashes, bruising or bleeding on exposed skin area  Extremities:  peripheral pulses palpable, no pedal edema or calf pain with palpation  Psych: normal affect     Investigations:      Laboratory Testing:  Recent Results (from the past 24 hour(s))   POC Glucose Fingerstick    Collection Time: 12/05/22  6:36 PM   Result Value Ref Range    POC Glucose 177 (H) 75 - 110 mg/dL   Basic Metabolic Panel w/ Reflex to MG    Collection Time: 12/06/22  5:50 AM   Result Value Ref Range    Glucose 184 (H) 70 - 99 mg/dL    BUN 14 6 - 20 mg/dL    Creatinine 1.02 0.70 - 1.20 mg/dL    Est, Glom Filt Rate >60 >60 mL/min/1.73m2    Calcium 8.3 (L) 8.6 - 10.4 mg/dL    Sodium 140 135 - 144 mmol/L    Potassium 4.5 3.7 - 5.3 mmol/L    Chloride 111 (H) 98 - 107 mmol/L    CO2 17 (L) 20 - 31 mmol/L    Anion Gap 12 9 - 17 mmol/L   CBC with Auto Differential    Collection Time: 12/06/22  5:50 AM   Result Value Ref Range    WBC 12.2 (H) 3.5 - 11.0 k/uL    RBC 4.50 4.5 - 5.9 m/uL    Hemoglobin 13.6 13.5 - 17.5 g/dL    Hematocrit 41.1 41 - 53 %    MCV 91.4 80 - 100 fL    MCH 30.2 26 - 34 pg    MCHC 33.0 31 - 37 g/dL    RDW 13.8 12.5 - 15.4 %    Platelets 276 433 - 851 k/uL    MPV 9.3 6.0 - 12.0 fL    Seg Neutrophils 88 (H) 36 - 66 %    Lymphocytes 5 (L) 24 - 44 %    Monocytes 6 2 - 11 %    Eosinophils % 0 (L) 1 - 4 %    Basophils 1 0 - 2 %    Segs Absolute 10.80 (H) 1.8 - 7.7 k/uL    Absolute Lymph # 0.60 (L) 1.0 - 4.8 k/uL    Absolute Mono # 0.80 0.1 - 1.2 k/uL    Absolute Eos # 0.00 0.0 - 0.4 k/uL    Basophils Absolute 0.10 0.0 - 0.2 k/uL       Imaging/[employer]agonstics:  No results found.     Assessment :      Hospital Problems             Last Modified POA    * (Principal) S/P lumbar spinal fusion 12/5/2022 Yes       Plan:     Lumbar degenerative disc disease  Status post fusion  Pain under excellent control  PT/OT  MADHAVI Antonio  Diabetes mellitus  Hold SGLT2 until tomorrow  Blood sugar trend reviewed, reasonable control at this point in time  Initiate insulin sliding scale if indicated  Essential hypertension  Blood pressure stable  Tachycardia  Likely secondary to pain  If no significant improvement in the next 24 hours will titrate antihypertensive medications and add beta-blocker  Hyperlipidemia  Continue Lipitor    Consultations:   IP CONSULT TO INTERNAL MEDICINE  IP CONSULT TO CASE MANAGEMENT  IP CONSULT TO [employer],   12/6/2022  2:47 PM    Copy sent to Dr. Yariel Peguero MD

## 2022-12-06 NOTE — PROGRESS NOTES
[address]  PROGRESS NOTE    Shift date: 12/6/22  Shift day: Tuesday   Shift # 1    Room # 082/089-36   Name: Reyna Perez                  Referral: Spiritual Care Consultation    Admit Date & Time: 12/5/2022 10:28 AM    Assessment:  Reyna Perez is a 45 y.o. male in the hospital. Upon entering the room writer observes Patient was lying in bed. Spouse was sitting in the chair next to Patient's bed. Patient and Vaughn Cheadle, smiled and greeted writer. They shared that Mike Zimmerman RN, who prayed with them before Pt's surgery yesterday told them about writer. They were grateful for Phoebe's prayers. Pt and Spouse were receptive to writer offering a prayer for Pt's healing. Spouse noted that many people have been praying for Pt, including neighbors and friends. Intervention:  Writer introduced self and title as . Writer inquired how Pt was coping. Writer asked about  Pt's and Spouse's support system. Writer asked if Pt had any needs today. Writer prayed for Pt's healing and recovery and for Spouse. Outcome:  Patient and Spouse thanked writer for the visit. Plan:  Chaplains will remain available to offer spiritual and emotional support as needed. 12/06/22 1623   Encounter Summary   Service Provided For: Patient   Referral/Consult From: Nurse   Support System Spouse;Friends/neighbors   Last Encounter  12/06/22   Complexity of Encounter Low   Begin Time 1600   End Time  1610   Total Time Calculated 10 min   Encounter    Type Initial Screen/Assessment   Spiritual/Emotional needs   Type Spiritual Support   Assessment/Intervention/Outcome   Assessment Calm;Coping   Intervention Active listening;Discussed illness injury and its impact; Discussed belief system/Buddhism practices/desire; Explored/Affirmed feelings, thoughts, concerns;Explored Coping Skills/Resources;Sustaining Presence/Ministry of presence;Prayer (assurance of)/[place]   Outcome Expressed Gratitude;Coping   Plan and Referrals   Plan/Referrals Continue Support (comment)     Electronically signed by Marcia Tucker, on 12/6/2022 at 4:25 PM.  [facility]  [phone]

## 2022-12-06 NOTE — PLAN OF CARE
Problem: Chronic Conditions and Co-morbidities  Goal: Patient's chronic conditions and co-morbidity symptoms are monitored and maintained or improved  Outcome: Progressing  Flowsheets (Taken 12/5/2022 1110 by Kim Thakur RN)  Care Plan - Patient's Chronic Conditions and Co-Morbidity Symptoms are Monitored and Maintained or Improved: Monitor and assess patient's chronic conditions and comorbid symptoms for stability, deterioration, or improvement     Problem: Safety - Adult  Goal: Free from fall injury  Outcome: Progressing     Problem: Pain  Goal: Verbalizes/displays adequate comfort level or baseline comfort level  Outcome: Progressing  Flowsheets  Taken 12/5/2022 2030 by Yadira Walden RN  Verbalizes/displays adequate comfort level or baseline comfort level: Assess pain using appropriate pain scale  Taken 12/5/2022 2000 by Yadira aWlden RN  Verbalizes/displays adequate comfort level or baseline comfort level:   Assess pain using appropriate pain scale   Administer analgesics based on type and severity of pain and evaluate response  Taken 12/5/2022 1945 by Yadira Walden RN  Verbalizes/displays adequate comfort level or baseline comfort level: Assess pain using appropriate pain scale  Taken 12/5/2022 1919 by Yadira Walden RN  Verbalizes/displays adequate comfort level or baseline comfort level:   Administer analgesics based on type and severity of pain and evaluate response   Assess pain using appropriate pain scale  Taken 12/5/2022 1910 by Yadira Walden RN  Verbalizes/displays adequate comfort level or baseline comfort level: Assess pain using appropriate pain scale  Taken 12/5/2022 1900 by Yadira Walden RN  Verbalizes/displays adequate comfort level or baseline comfort level: Assess pain using appropriate pain scale  Taken 12/5/2022 1855 by Yadira Walden RN  Verbalizes/displays adequate comfort level or baseline comfort level:   Assess pain using appropriate pain scale   Administer analgesics based on type and severity of pain and evaluate response  Taken 12/5/2022 1838 by Yadira Walden RN  Verbalizes/displays adequate comfort level or baseline comfort level: Assess pain using appropriate pain scale  Taken 12/5/2022 1833 by Yadira Walden RN  Verbalizes/displays adequate comfort level or baseline comfort level: Assess pain using appropriate pain scale     Problem: Discharge Planning  Goal: Discharge to home or other facility with appropriate resources  Outcome: Progressing

## 2022-12-07 VITALS
HEART RATE: 113 BPM | BODY MASS INDEX: 30.7 KG/M2 | TEMPERATURE: 98.2 F | WEIGHT: 226.63 LBS | OXYGEN SATURATION: 94 % | DIASTOLIC BLOOD PRESSURE: 98 MMHG | RESPIRATION RATE: 16 BRPM | HEIGHT: 72 IN | SYSTOLIC BLOOD PRESSURE: 156 MMHG

## 2022-12-07 PROBLEM — M51.9 LUMBAR DISC DISEASE: Status: ACTIVE | Noted: 2022-12-07

## 2022-12-07 LAB — GLUCOSE BLD-MCNC: 160 MG/DL (ref 75–110)

## 2022-12-07 PROCEDURE — 97116 GAIT TRAINING THERAPY: CPT

## 2022-12-07 PROCEDURE — 82947 ASSAY GLUCOSE BLOOD QUANT: CPT

## 2022-12-07 PROCEDURE — 97535 SELF CARE MNGMENT TRAINING: CPT

## 2022-12-07 PROCEDURE — 6370000000 HC RX 637 (ALT 250 FOR IP): Performed by: HOSPITALIST

## 2022-12-07 PROCEDURE — 6370000000 HC RX 637 (ALT 250 FOR IP): Performed by: STUDENT IN AN ORGANIZED HEALTH CARE EDUCATION/TRAINING PROGRAM

## 2022-12-07 PROCEDURE — 99217 PR OBSERVATION CARE DISCHARGE MANAGEMENT: CPT | Performed by: HOSPITALIST

## 2022-12-07 PROCEDURE — 6370000000 HC RX 637 (ALT 250 FOR IP): Performed by: NURSE PRACTITIONER

## 2022-12-07 RX ORDER — ASPIRIN 81 MG/1
81 TABLET ORAL DAILY
Qty: 30 TABLET | Refills: 3
Start: 2022-12-10

## 2022-12-07 RX ORDER — CARBAMAZEPINE 200 MG/1
400 TABLET ORAL ONCE
Status: COMPLETED | OUTPATIENT
Start: 2022-12-07 | End: 2022-12-07

## 2022-12-07 RX ORDER — CARBAMAZEPINE 100 MG/1
200 TABLET, CHEWABLE ORAL ONCE
Status: COMPLETED | OUTPATIENT
Start: 2022-12-07 | End: 2022-12-07

## 2022-12-07 RX ORDER — OXYCODONE HYDROCHLORIDE AND ACETAMINOPHEN 5; 325 MG/1; MG/1
1-2 TABLET ORAL
Qty: 50 TABLET | Refills: 0 | Status: SHIPPED | OUTPATIENT
Start: 2022-12-07 | End: 2022-12-14

## 2022-12-07 RX ADMIN — LOSARTAN POTASSIUM 100 MG: 50 TABLET, FILM COATED ORAL at 08:32

## 2022-12-07 RX ADMIN — CARBAMAZEPINE 200 MG: 100 TABLET, CHEWABLE ORAL at 10:20

## 2022-12-07 RX ADMIN — CARBAMAZEPINE 400 MG: 200 TABLET ORAL at 10:20

## 2022-12-07 RX ADMIN — SENNOSIDES AND DOCUSATE SODIUM 1 TABLET: 50; 8.6 TABLET ORAL at 08:31

## 2022-12-07 RX ADMIN — BUPROPION HYDROCHLORIDE 300 MG: 150 TABLET, EXTENDED RELEASE ORAL at 08:31

## 2022-12-07 RX ADMIN — ACETAMINOPHEN 1000 MG: 500 TABLET ORAL at 05:19

## 2022-12-07 RX ADMIN — LAMOTRIGINE 100 MG: 100 TABLET ORAL at 08:31

## 2022-12-07 RX ADMIN — GLIPIZIDE 10 MG: 5 TABLET ORAL at 08:38

## 2022-12-07 RX ADMIN — ACETAMINOPHEN 1000 MG: 500 TABLET ORAL at 10:20

## 2022-12-07 RX ADMIN — POLYETHYLENE GLYCOL 3350 17 G: 17 POWDER, FOR SOLUTION ORAL at 05:20

## 2022-12-07 RX ADMIN — ATORVASTATIN CALCIUM 80 MG: 40 TABLET, FILM COATED ORAL at 08:31

## 2022-12-07 RX ADMIN — EMPAGLIFLOZIN 10 MG: 10 TABLET, FILM COATED ORAL at 08:31

## 2022-12-07 RX ADMIN — BISACODYL 5 MG: 5 TABLET, COATED ORAL at 08:31

## 2022-12-07 RX ADMIN — LEVOTHYROXINE SODIUM 50 MCG: 50 TABLET ORAL at 08:32

## 2022-12-07 RX ADMIN — FAMOTIDINE 20 MG: 20 TABLET ORAL at 08:32

## 2022-12-07 RX ADMIN — CLONIDINE HYDROCHLORIDE 0.1 MG: 0.1 TABLET ORAL at 08:31

## 2022-12-07 RX ADMIN — OXYCODONE HYDROCHLORIDE 10 MG: 5 TABLET ORAL at 01:12

## 2022-12-07 RX ADMIN — OXYCODONE HYDROCHLORIDE 10 MG: 5 TABLET ORAL at 10:20

## 2022-12-07 RX ADMIN — OXYCODONE HYDROCHLORIDE 10 MG: 5 TABLET ORAL at 05:20

## 2022-12-07 RX ADMIN — METFORMIN HYDROCHLORIDE 1000 MG: 500 TABLET ORAL at 08:31

## 2022-12-07 ASSESSMENT — PAIN SCALES - GENERAL
PAINLEVEL_OUTOF10: 6
PAINLEVEL_OUTOF10: 7
PAINLEVEL_OUTOF10: 8

## 2022-12-07 ASSESSMENT — PAIN DESCRIPTION - LOCATION
LOCATION: BACK
LOCATION: ABDOMEN;BACK
LOCATION: BACK

## 2022-12-07 ASSESSMENT — PAIN DESCRIPTION - DESCRIPTORS
DESCRIPTORS: ACHING
DESCRIPTORS: ACHING

## 2022-12-07 ASSESSMENT — PAIN SCALES - WONG BAKER
WONGBAKER_NUMERICALRESPONSE: 0
WONGBAKER_NUMERICALRESPONSE: 0

## 2022-12-07 ASSESSMENT — PAIN - FUNCTIONAL ASSESSMENT
PAIN_FUNCTIONAL_ASSESSMENT: ACTIVITIES ARE NOT PREVENTED
PAIN_FUNCTIONAL_ASSESSMENT: ACTIVITIES ARE NOT PREVENTED

## 2022-12-07 NOTE — PLAN OF CARE
Problem: Safety - Adult  Goal: Free from fall injury  Outcome: Progressing   No falls/injuries this shift, bed in lowest position, brakes on, bed alarm on, call light in reach, side rails up x2   Problem: Pain  Goal: Verbalizes/displays adequate comfort level or baseline comfort level  Outcome: Progressing   No new signs/symptoms of pain noted, pain rating < 3 on scale 0-10, pain controlled with medication/repositioning

## 2022-12-07 NOTE — PROGRESS NOTES
Called by ortho for patient left eye swelling postop. Patient was seen and examed this morning, eye swelling is secondary patient prone position for 6 hours , pressure on the headrest, plus IV fluids. Patient states he feels much better and  left eye swelling much improved than yesterday. Explained reasons causing  his eye swelling.  He understands and questions answered

## 2022-12-07 NOTE — PROGRESS NOTES
Physical Therapy  Facility/Department: [facility] ICU  Physical Therapy Daily Progress Note    Name: Glenna Giraldo  : 1970  MRN: [id number]  Date of Service: 2022    Discharge Recommendations:  Patient would benefit from continued therapy after discharge   PT Equipment Recommendations  Equipment Needed: No  Other: Pt reports having a RW and cane at home. Patient Diagnosis(es): The primary encounter diagnosis was S/P lumbar spinal fusion. A diagnosis of Post-op pain was also pertinent to this visit. Past Medical History:  has a past medical history of Anxiety, Bipolar 1 disorder ([facility] [address] Depression, Diabetes mellitus ([facility] [address] Hyperlipidemia, Hypertension, PTSD (post-traumatic stress disorder), Restless legs syndrome, and Thyroid disease. Past Surgical History:  has a past surgical history that includes back surgery; Colonoscopy; Knee arthroscopy (Right); Tonsillectomy; lumbar laminectomy (2022); and lumbar fusion (N/A, 2022). Assessment   Body Structures, Functions, Activity Limitations Requiring Skilled Therapeutic Intervention: Decreased functional mobility ; Decreased safe awareness; Increased pain;Decreased balance  Assessment: Pt with improved mobility due to decreased pain c/o's. Pt ambulated with ' x 2 with SBA and negotiated 2 steps with (B) rails. Pt will be able to return home with assistance from spouse as needed.   Therapy Prognosis: Good  Requires PT Follow-Up: Yes  Activity Tolerance  Activity Tolerance: Patient tolerated treatment well     Plan   Physcial Therapy Plan  General Plan: Other (See Comment) (1x/day 6 x/week)  Current Treatment Recommendations: Balance training, Functional mobility training, Transfer training, Stair training, Gait training, Therapeutic activities, Patient/Caregiver education & training, Safety education & training  Safety Devices  Type of Devices: Call light within reach, Gait belt, Left in bed  Restraints  Restraints Initially in Place: No     Restrictions  Restrictions/Precautions  Restrictions/Precautions: Surgical protocol, General Precautions  Required Braces or Orthoses?: No  Position Activity Restriction  Spinal Precautions: No Bending, No Lifting, No Twisting  Spinal Precautions: No excessive BLT  Other position/activity restrictions: Up w/ Assist     Subjective   General  Patient assessed for rehabilitation services?: Yes  Family / Caregiver Present: Yes  Referring Practitioner: Katlyn Saavedra  Referral Date : 12/06/22  Diagnosis: s/p lumbar fusion  Follows Commands: Within Functional Limits  Subjective  Subjective: Pt supine in bed and agreeable to therapy. Pt reports little pain in back and mild pain in (L) groin- 3/10. Cognition   Orientation  Overall Orientation Status: Within Normal Limits  Orientation Level: Oriented X4  Cognition  Overall Cognitive Status: WNL     Objective      Bed mobility  Supine to Sit: Stand by assistance  Sit to Supine: Stand by assistance  Scooting: Stand by assistance  Bed Mobility Comments: Head of bed flat without use of bed rail; pt demonstrated proper technique with bed mobility. Transfers  Sit to Stand: Stand by assistance  Stand to Sit: Stand by assistance  Stand Pivot Transfers: Stand by assistance  Comment: Transfers with RW. Ambulation  Surface: Level tile  Device: Rolling Walker  Assistance: Stand by assistance  Quality of Gait: Improved gait speed but guarding still noted. Gait Deviations: Slow Stephanie;Decreased step length  Distance: 150' x 2  Comments: Improved balance and decreased pain during ambulation today. More Ambulation?: No  Stairs/Curb  Stairs?: Yes  Stairs  # Steps : 2  Stairs Height: 6\"  Rails: Bilateral  Device: No Device  Assistance: Stand by assistance  Comment: Pt negotiated 2 steps with (B) rails SBA without LOB.      Balance  Posture: Good  Sitting - Dynamic: Good  Standing - Static: Fair;+  Standing - Dynamic: Fair;+  Comments: standing balance assessed with RW OutComes Score                                                  AM-PAC Score  AM-PAC Inpatient Mobility Raw Score : 19 (12/07/22 1603)  AM-PAC Inpatient T-Scale Score : 45.44 (12/07/22 1603)  Mobility Inpatient CMS 0-100% Score: 41.77 (12/07/22 1603)  Mobility Inpatient CMS G-Code Modifier : CK (12/07/22 1603)          Tinneti Score       Goals  Short Term Goals  Time Frame for Short Term Goals: 14 visits  Short Term Goal 1: Pt to transfer Independently without LOB. Short Term Goal 2: Pt to ambulate with least restrictive device/no device 150' Modified (I) without LOB. Short Term Goal 3: Pt to negotiate 3-4 steps with (B) rails Modified (I) without LOB for entry into home.   Patient Goals   Patient Goals : Return home       Education  Patient Education  Education Given To: Patient  Education Provided: Plan of Care  Education Provided Comments: surgical precautions, stair negotiation, car transfer education  Education Method: Demonstration;Verbal  Barriers to Learning: None  Education Outcome: Verbalized understanding;Demonstrated understanding      Therapy Time   Individual Concurrent Group Co-treatment   Time In 0954         Time Out 1011         Minutes 17         Timed Code Treatment Minutes: Brenda Hand 1452, PT

## 2022-12-07 NOTE — PROGRESS NOTES
[facility]  Office: [address], DO, Nestor Morris, DO, Christine July, DO, Eliz Grullon Blood, DO, Leonard Henning MD, Roxane Reid MD, Odalis Hicks MD, Sylvie Bernheim, MD,  Christie Blanco MD, Sadie Vaca MD, Maribell Loco, DO, Yennifer Delgadillo MD,  Amadeo Yang MD, Robert Hernandez MD, Frantz Wallace, DO, Queta Nam MD, Ángel Oshea MD, Mary Alicia, DO, Keri Grijalva MD, Christine Tinoco MD, Fatou See MD, Lizeth Godoy MD, Viry aHnsen DO, Chanel Moreira MD, Oliver Castanon MD, Viki Du, Raudel Merino, CNP, Heraclio Montez, CNP, Den Larios, CNP,  Terry Diaz, [facility], Hector Bryant, CNP, Dagmar Wheeler, CNP, Juan R Wright, CNP, Yomaira Rodriguez, CNP, Leti Juan, [facility], Michelle Barnes PA-C, Miquel Fuentes, CNS, Jr Abdi, CNP, Rosanne Moreno 4072    Progress Note    12/7/2022    11:34 AM    Name:   Reese Donato  MRN:     [id number]     Acct:      [de-identified]   Room:   [address] [place] [address] Day:  2  Admit Date:  12/5/2022 10:28 AM    PCP:   Lori Willams MD  Code Status:  Full Code    Subjective:     C/C: \" My back is sore, but I feel better\"    Interval History Status: improved. Patient is doing quite well. No major issues overnight. He does have an abrasion over his left eye and some periorbital swelling. Case discussed with Dr. Peggy Manning. Anesthesiology evaluation noted. I do recommend some Neosporin over the abrasion. Patient as well as wife understand that it will improve with time. He denies any questions or concerns at this point time. Brief History: This very pleasant 68-year-old male was brought to the hospital for an elective L2/L3 fusion. The patient has had an uneventful postoperative course and is medically stable for discharge.     Review of Systems:     Constitutional:  negative for chills, fevers, sweats  Respiratory:  negative for cough, dyspnea on exertion, shortness of breath, wheezing  Cardiovascular:  negative for chest pain, chest pressure/discomfort, lower extremity edema, palpitations  Gastrointestinal:  negative for abdominal pain, constipation, diarrhea, nausea, vomiting  Neurological:  negative for dizziness, headache    Medications: Allergies: Allergies   Allergen Reactions    Fentanyl Nausea And Vomiting     Violently vomits with patch       Current Meds:   Scheduled Meds:    gabapentin  600 mg Oral Nightly    atorvastatin  80 mg Oral Daily    buPROPion  300 mg Oral QAM    clonazePAM  2 mg Oral Nightly    cloNIDine  0.1 mg Oral BID    empagliflozin  10 mg Oral Daily    glipiZIDE  10 mg Oral BID AC    lamoTRIgine  100 mg Oral TID    losartan  100 mg Oral Daily    metFORMIN  1,000 mg Oral BID WC    QUEtiapine  400 mg Oral Nightly    rOPINIRole  1 mg Oral Nightly    sodium chloride flush  5-40 mL IntraVENous 2 times per day    acetaminophen  1,000 mg Oral Q6H    bisacodyl  5 mg Oral Daily    sennosides-docusate sodium  1 tablet Oral BID    famotidine  20 mg Oral BID    levothyroxine  50 mcg Oral BID    risperiDONE  2 mg Oral Nightly    liothyronine  12.5 mcg Oral BID     Continuous Infusions:    sodium chloride       PRN Meds: cyclobenzaprine, sodium chloride flush, sodium chloride, ondansetron **OR** ondansetron, polyethylene glycol, oxyCODONE **OR** oxyCODONE, HYDROmorphone **OR** HYDROmorphone, sodium phosphate, morphine    Data:     Past Medical History:   has a past medical history of Anxiety, Bipolar 1 disorder ([facility] [address] Depression, Diabetes mellitus ([facility] [address] Hyperlipidemia, Hypertension, PTSD (post-traumatic stress disorder), Restless legs syndrome, and Thyroid disease. Social History:   reports that he has never smoked. He has never used smokeless tobacco. He reports current alcohol use. He reports current drug use. Drug: Marijuana Sable M[place]). Family History: History reviewed.  No pertinent family history. Vitals:  BP (!) 156/98   Pulse (!) 113   Temp 98.2 °F (36.8 °C) (Oral)   Resp 16   Ht 6' (1.829 m)   Wt 226 lb 10.1 oz (102.8 kg)   SpO2 94%   BMI 30.74 kg/m²   Temp (24hrs), Av.6 °F (37 °C), Min:98.2 °F (36.8 °C), Max:98.8 °F (37.1 °C)    Recent Labs     22  11222  18322  0749   POCGLU 167* 177* 160*       I/O (24Hr): Intake/Output Summary (Last 24 hours) at 2022 1134  Last data filed at 2022 0550  Gross per 24 hour   Intake 650 ml   Output 6570 ml   Net -5920 ml       Labs:  Hematology:  Recent Labs     22  0550   WBC 12.2*   RBC 4.50   HGB 13.6   HCT 41.1   MCV 91.4   MCH 30.2   MCHC 33.0   RDW 13.8      MPV 9.3     Chemistry:  Recent Labs     22  0550      K 4.5   *   CO2 17*   GLUCOSE 184*   BUN 14   CREATININE 1.02   ANIONGAP 12   LABGLOM >60   CALCIUM 8.3*     Recent Labs     22  18322  0749   POCGLU 167* 177* 160*     ABG:No results found for: POCPH, PHART, PH, POCPCO2, CCH5NVG, PCO2, POCPO2, PO2ART, PO2, POCHCO3, PHU2KVY, HCO3, NBEA, PBEA, BEART, BE, THGBART, THB, HJS0TID, XHOT2CQN, I8DJPCBO, O2SAT, FIO2  No results found for: SPECIAL  No results found for: CULTURE    Radiology:  No results found.     Physical Examination:       General appearance:  alert, cooperative and no distress  Mental Status:  oriented to person, place and time and normal affect  Lungs:  clear to auscultation bilaterally, normal effort  Heart:  regular rate and rhythm, no murmur  Abdomen:  soft, nontender, nondistended, normal bowel sounds, no masses, hepatomegaly, splenomegaly  Extremities:  no edema, redness, tenderness in the calves  Skin: Small abrasion over the left eyebrow noted    Assessment:     Hospital Problems             Last Modified POA    * (Principal) S/P lumbar spinal fusion 2022 Yes       Plan:     Lumbar degenerative disc disease  Status post fusion  Pain under excellent control  Diabetes mellitus  Okay to resume SGLT2  Essential hypertension  Blood pressure overall stable  Tachycardia  Patient's tachycardia likely reactive secondary to pain, would recommend outpatient follow-up as I suspect this will normalize  No immediate intervention required  Hyperlipidemia  Continue Lipitor    Medically stable for discharge    Sita Tracey DO  12/7/2022  11:34 AM

## 2022-12-07 NOTE — DISCHARGE SUMMARY
[facility]  Office: [address], DO, Ramos Lafleur, DO, Bereket Bronson, DO, [place] Irene Browne, DO, Velton Habermann, MD, Karina García MD, Jabari Babb MD, Teofilo Correa MD,  Anamaria Tse MD, Tigist Allison MD, Dorie Morales DO, Anna Estrada MD,  Rebel Escobar MD, Екатерина Dupont MD, Mariusz Morrell DO, Lisa Watts MD, Renata Corrales MD, Helena Null DO, Zach Thacker MD, Yamilka Hughes MD, Alex Watson MD, Frederick Michel MD, Krishan Luu, DO, Lucy Mahajan MD, Aron Daugherty MD, Lazarus Farias, CNP,  Leatha Henderson, CNP, Fallon Ware, CNP, Rachel Mclaughlin, CNP,  Mihir Abrams, [facility], Laura Mejias, CNP, Branden Mayer, CNP, Juan R Diallo, CNP, Kathleen Mcdonald, CNP, Shaka Nugent, CNP, Felix Nieves, PA-C, Ryan Albarado, CNS, Toni Drew, CNP, Andreea Elias, CNP         [address] [facility]    Discharge Summary     Patient ID: Santiago Coy  :  1970   MRN: [id number]     ACCOUNT:  [de-identified]   Patient's PCP: Nury Granado MD  Admit Date: 2022   Discharge Date: 2022     Length of Stay: 2  Code Status:  Full Code  Admitting Physician: Prema Tejada DO  Discharge Physician: Sarahy Muhammad DO     Active Discharge Diagnoses:     Hospital Problem Lists:  Principal Problem:    S/P lumbar spinal fusion  Active Problems:    Lumbar disc disease  Resolved Problems:    * No resolved hospital problems. *      Admission Condition:  fair     Discharged Condition: good    Hospital Stay:     Hospital Course:  Santiago Coy is a 45 y.o. male who was admitted for the management of  S/P lumbar spinal fusion , presented to ER with back pain. This very pleasant 68-year-old male with known degenerative disc disease present in the hospital for elective fusion of the lumbar spine. Patient underwent L2/L3 lumbar laminectomy along with fusion of the above-mentioned levels.   Postoperatively he had an uneventful course. He ultimately was discharged home in stable condition. Significant therapeutic interventions: As above    Significant Diagnostic Studies:   Labs / Micro:  CBC:   Lab Results   Component Value Date/Time    WBC 12.2 12/06/2022 05:50 AM    RBC 4.50 12/06/2022 05:50 AM    HGB 13.6 12/06/2022 05:50 AM    HCT 41.1 12/06/2022 05:50 AM    MCV 91.4 12/06/2022 05:50 AM    MCH 30.2 12/06/2022 05:50 AM    MCHC 33.0 12/06/2022 05:50 AM    RDW 13.8 12/06/2022 05:50 AM     12/06/2022 05:50 AM     BMP:    Lab Results   Component Value Date/Time    GLUCOSE 184 12/06/2022 05:50 AM     12/06/2022 05:50 AM    K 4.5 12/06/2022 05:50 AM     12/06/2022 05:50 AM    CO2 17 12/06/2022 05:50 AM    ANIONGAP 12 12/06/2022 05:50 AM    BUN 14 12/06/2022 05:50 AM    CREATININE 1.02 12/06/2022 05:50 AM    CALCIUM 8.3 12/06/2022 05:50 AM    LABGLOM >60 12/06/2022 05:50 AM        Radiology:  No results found. Consultations:    Consults:     Final Specialist Recommendations/Findings:   IP CONSULT TO INTERNAL MEDICINE  IP CONSULT TO CASE MANAGEMENT  IP CONSULT TO SPIRITUAL SERVICES      The patient was seen and examined on day of discharge and this discharge summary is in conjunction with any daily progress note from day of discharge. Discharge plan:     Disposition: Home    Physician Follow Up: DO Wendy Figueroa[address] [address]  [phone]    Schedule an appointment as soon as possible for a visit in 2 week(s)  post-op check       Requiring Further Evaluation/Follow Up POST HOSPITALIZATION/Incidental Findings: None    Diet: diabetic diet    Activity: As tolerated    Instructions to Patient: None    Discharge Medications:      Medication List        START taking these medications      oxyCODONE-acetaminophen 5-325 MG per tablet  Commonly known as: Percocet  Take 1-2 tablets by mouth every 4-6 hours as needed for Pain for up to 7 days.             CHANGE how you take these medications      aspirin 81 MG EC tablet  Take 1 tablet by mouth daily  Start taking on: December 10, 2022  What changed: These instructions start on December 10, 2022. If you are unsure what to do until then, ask your doctor or other care provider. CONTINUE taking these medications      atorvastatin 80 MG tablet  Commonly known as: LIPITOR     buPROPion 300 MG extended release tablet  Commonly known as: WELLBUTRIN XL     Bydureon 2 MG Srer injection  Generic drug: exenatide     carBAMazepine 200 MG extended release capsule  Commonly known as: CARBATROL     clonazePAM 2 MG tablet  Commonly known as: KLONOPIN     cloNIDine 0.1 MG tablet  Commonly known as: CATAPRES     cyclobenzaprine 10 MG tablet  Commonly known as: FLEXERIL     dapagliflozin 10 MG tablet  Commonly known as: FARXIGA     gabapentin 600 MG tablet  Commonly known as: NEURONTIN     glipiZIDE 10 MG tablet  Commonly known as: GLUCOTROL     lamoTRIgine 100 MG tablet  Commonly known as: LAMICTAL     levothyroxine 100 MCG tablet  Commonly known as: SYNTHROID     liothyronine 25 MCG tablet  Commonly known as: CYTOMEL     losartan 100 MG tablet  Commonly known as: COZAAR     metFORMIN 1000 MG tablet  Commonly known as: GLUCOPHAGE     QUEtiapine 400 MG tablet  Commonly known as: SEROQUEL     risperiDONE 2 MG tablet  Commonly known as: RISPERDAL     rOPINIRole 1 MG tablet  Commonly known as: REQUIP               Where to Get Your Medications        You can get these medications from any pharmacy    Bring a paper prescription for each of these medications  oxyCODONE-acetaminophen 5-325 MG per tablet       Information about where to get these medications is not yet available    Ask your nurse or doctor about these medications  aspirin 81 MG EC tablet         No discharge procedures on file.     Time Spent on discharge is  20 mins in patient examination, evaluation, counseling as well as medication reconciliation, prescriptions for required medications, discharge plan and follow up. Electronically signed by   Meghan Campos DO  12/7/2022  11:38 AM      Thank you Dr. David Adams MD for the opportunity to be involved in this patient's care.

## 2022-12-07 NOTE — DISCHARGE INSTRUCTIONS
Orthopedic Instructions:  -Mobilize as tolerated. Avoid excessive Bending, Lifting, and Twisting (BLT). -May shower 48 hours post-op. Just let water run over dressing/incision, do not scrub over surgical area. -Keep dressing clean and dry. Maintain in place until follow-up if possible. If dressing comes off prior to follow-up and no drainage may leave uncovered. If would like to cover use simple gauze and tape dressing.  -Ice (20 minutes on and off 1 hour) and elevate (above heart) as needed for swelling/pain  -Drink plenty of fluids.  -Call the office or come to Emergency Room if signs of infection appear (hot, swollen, red, draining pus, fever)  -Take medications as prescribed.  -Wean off narcotics (Percocet/Norco) as soon as possible. Do not take additional Tylenol if still taking narcotics. -No alcoholic beverages or driving/operating machinery while on narcotics.  -Follow up with Dr. Khalida Thomas in his office in 2 weeks after surgery/discharge. Call [phone] to schedule.

## 2022-12-07 NOTE — FLOWSHEET NOTE
Dr Duarte Belcher at bedside, Dr Clarice Liao prior to, drain removed to back by dr Duarte Belcher secured with dressing patient tolerated well and ok for discharge per dr Duarte Belcher.

## 2022-12-07 NOTE — PROGRESS NOTES
Progress Note    Patient:  Glenna Giraldo  YOB: 1970     45 y.o. male    Subjective:  Patient seen and examined at bedside this morning. No new complaints or concerns per patient this morning. No acute issues overnight per nursing. Pain well-controlled. Denies: fever/chills, HA, CP, SOB, N/V, dysuria, or numbness/tingling in extremities. -BM/+flatus. PT: ambulated well and worked with PT/OT. Left eye swelling much improved. Objective:   Vitals:    12/07/22 0748   BP: (!) 156/98   Pulse: (!) 113   Resp: 16   Temp: 98.2 °F (36.8 °C)   SpO2: 94%     Gen: NAD, cooperative, lying comfortably supine in bed, left periorbital swelling significantly improved with some mild abrasion and superficial skin desquamation  Back: Appropriate post-op TTP. Dressing c/d/i. Hemovac drain in place with serosanguinous putput. LE  L psoas 5/5, R psoas 5/5  L quads 5/5, R quads 5/5  L TA 5/5, R TA 5/5  L EHL 5/5, R EHL 5/5  L GSC 5/5, R GSC 5/5  Sensation intact to light touch L2-S1 bilaterally  No clonus    Recent Labs     12/06/22  0550   WBC 12.2*   HGB 13.6   HCT 41.1         K 4.5   BUN 14   CREATININE 1.02   GLUCOSE 184*       Meds:   See rec for complete list    Impression:    Plan: 45 y.o. male s/p lami extension of fusion L2-3, POD#2    -Left eye swelling improved with some mild superficial skin desquamation. Apply bacitracin.  -Hemovac drain pulled at bedside this morning. Drain site dressing applied.   -AAT  -Pain control: Roxicodone PO prn, Dilaudid IV prn breakthrough, Flexeril and Gabapentin; avoid NSAIDs  -Tolerating PO intake well  -Voiding well  -Ice (20 min, 1 hour off) for edema/pain control  -Encourage IS and deep breathing  -DVT ppx: encourage being up and OOB & EPC, no chemical AC until POD#5  -PT/OT  -Dispo: plan for discharge home later today  -Follow up in office 2 weeks  -Please page Ortho with any questions or concerns    Jose Davidson, DO  Orthopedic Spine Surgery  8:06 AM 12/7/2022

## 2022-12-08 NOTE — PROGRESS NOTES
Occupational Therapy  Facility/Department: [facility] MED SURG ICU  Daily Treatment Note    NAME: Bessie Kirk  : 1970  MRN: [id number]  Date of Service: 2022    Discharge Recommendations:  Patient would benefit from continued therapy after discharge    OT Equipment Recommendations  Other: Grab bars toilet. Grab bars shower, Handheld showerhead. TBD / Will continue to make AD and DME recommendations. Patient Diagnosis(es): The primary encounter diagnosis was S/P lumbar spinal fusion. A diagnosis of Post-op pain was also pertinent to this visit. Assessment    Activity Tolerance: Patient tolerated treatment well;Patient tolerated evaluation without incident  Discharge Recommendations: Patient would benefit from continued therapy after discharge  Other: Grab bars toilet. Grab bars shower, Handheld showerhead. TBD / Will continue to make AD and DME recommendations. Plan   Occupational Therapy Plan  Times Per Week: 5-6x/week  (L Fusion)  Times Per Day: Once a day  Current Treatment Recommendations: Balance training;ROM; Functional mobility training; Endurance training;Neuromuscular re-education;Equipment evaluation, education, & procurement;Patient/Caregiver education & training; Safety education & training;Pain management;Self-Care / ADL; Home management training       Restrictions  Restrictions/Precautions  Restrictions/Precautions: Surgical protocol;General Precautions  Required Braces or Orthoses?: No  Position Activity Restriction  Spinal Precautions: No Bending; No Lifting; No Twisting  Other position/activity restrictions: Up w/ Assist      Subjective   Subjective  Subjective: RN approved Pt to be seen for OT treatment session. Pt's wife was present throughout session.   Orientation  Overall Orientation Status: Within Functional Limits  Orientation Level: Oriented X4  Cognition  Overall Cognitive Status: WFL      Objective    Bed Mobility Training  Bed Mobility Training: Yes  Overall Level of Assistance: Stand-by assistance; Additional time; Adaptive equipment (HOB flat, Bedrail (Left). Pt was able to maintain // integrate log rolling technique (to maintain spinal precautions))  Interventions: Verbal cues (Intermittent Min Cues for overall safety // integration of Ax pacing)  Rolling: Stand-by assistance; Adaptive equipment; Additional time  Supine to Sit: Stand-by assistance; Adaptive equipment; Additional time  Sit to Supine: Stand-by assistance; Adaptive equipment; Additional time  Scooting: Stand-by assistance; Adaptive equipment; Additional time    Balance  Sitting: Intact  Standing: With support (Dynamic standing balance (Fair- balance, CGA progressing to SBa))  Transfer Training  Transfer Training: Yes  Overall Level of Assistance: Stand-by assistance;Contact-guard assistance; Additional time; Adaptive equipment (CGA progressing to SBA, using RW)  Interventions: Visual cues; Verbal cues; Safety awareness training; Tactile cues (Min to Mod Cues for safe & accurate use of DME // maintenance of spinal precautions)  Sit to Stand: Contact-guard assistance;Stand-by assistance; Additional time; Adaptive equipment  Stand to Sit: Contact-guard assistance;Stand-by assistance; Additional time; Adaptive equipment  Stand Pivot Transfers: Contact-guard assistance;Stand-by assistance; Additional time; Adaptive equipment  Toilet Transfer: Stand-by assistance; Additional time (Using standard toilet without DME (Pt completed without grab bars to simulate home setup))    Gait  Overall Level of Assistance: Contact-guard assistance;Stand-by assistance; Additional time; Adaptive equipment  Interventions: Visual cues; Verbal cues; Tactile cues; Safety awareness training (Min to Mod Cues for safe & accurate use of DME // maintenance of spinal precautions // integration of Ax pacing)  Assistive Device: Walker, rolling    ADL  Grooming: Stand by assistance;Verbal cueing; Increased time to complete; Adaptive equipment  Grooming Skilled Clinical Factors: Dynamic standing at the sink for hand hygiene. Min VCs // Instruction for safe and accurate placmenet of RW at the sink. UE Dressing: Minimal assistance; Increased time to complete;Verbal cueing  UE Dressing Skilled Clinical Factors: UBD to doff / don overhead shirt while maintaining spinal precautions. LE Dressing: Stand by assistance;Verbal cueing; Increased time to complete;Minimal assistance;Contact guard assistance; Adaptive equipment  LE Dressing Skilled Clinical Factors: SBA while sitting EOB to doff // don Bilateral socks (without AE, Pt declined to use). Min Assist for pants over hips in standing (with CGA, with RW). Toileting: Minimal assistance;Contact guard assistance; Adaptive equipment  Toileting Skilled Clinical Factors: Min Assist for pants // underwear mgmt in standing (with CGA, using RW). Discussed and simulated toilet hygiene (SBA with RW in standing). Safety Devices  Type of Devices: Call light within reach;Gait belt;Left in bed  Restraints  Restraints Initially in Place: No     Patient Education  Education Given To: Patient; Family  Education Provided: Family Education;Precautions; Equipment;ADL Adaptive Strategies;Transfer Training; Fall Prevention Strategies; Energy Conservation  Education Provided Comments: Spinal Precautions, Maintenance of Spinal Precautions during functional tasks and mobility / transitional movements. Education Method: Demonstration;Verbal  Barriers to Learning: None  Education Outcome: Verbalized understanding;Demonstrated understanding      Goals  Short Term Goals  Time Frame for Short Term Goals: Within 14 tx sessions -  Short Term Goal 1: Pt will verbalize // demo Mod I and Good Maintenance of Spinal Precautions during all functional tasks // transfers. Short Term Goal 2: Pt will demo Mod I and Good Integration of AE // DME during UB and LB ADLs. Short Term Goal 3: Pt will participate in Bathroom and ADL Transfers with Mod I without LOB.   Short Term Goal 4: Pt will maintain Dynamic Standing Balance (8-10 mins) during functional tasks (Lumbar pain less than 4/10). Short Term Goal 5: Pt will participate in Functional Mobility (in-room // in-home negotiation) with Supervision assist with Fair+ Integration of EC // Ax pacing.        Therapy Time   Individual Concurrent Group Co-treatment   Time In 1025         Time Out 1038         Minutes 13         Timed Code Treatment Minutes: 13 Minutes (ADL)    ROMAN Rose, OTR/L

## 2022-12-12 NOTE — OP NOTE
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 Item Serial No.  Lot No. LRB No. Used Action   GRAFT BNE SUB SM 1ML CRYOPRESERVED VIABLE XENIA CANC BNE - [id number]  GRAFT BNE SUB SM 1ML CRYOPRESERVED VIABLE XENIA CANC BNE [id number] [facility]  N/A 1 Implanted   GRAFT BNE SUB SM 1ML CRYOPRESERVED VIABLE XENIA CANC BNE - [id number]  GRAFT BNE SUB SM 1ML CRYOPRESERVED VIABLE XENIA CANC BNE [id number] [facility]  N/A 1 Implanted   GRAFT BNE SUB 5CC DEMIN BNE MTRX FRZ DRY PROGENIX + - [id number]  GRAFT BNE SUB 5CC DEMIN BNE MTRX FRZ DRY PROGENIX + [id number] [employer] SPINALGRAFT TECH-  N/A 1 Implanted   CONNECTOR SPNL DIA5. 5MM SIDE LD [facility] [place] - [id number]  CONNECTOR SPNL DIA5. 5MM [facility]IDE [facility]  N/A 2 Implanted   SET SCR SPNL DIA6. 35MM STD TI - [id number]  SET SCR SPNL DIA6. 35MM STD TI  [employer] SOFAMOR DANEK-WD  N/A 4 Implanted   SCREW SPNL L40MM DIA6.5MM THORACOLUMBOSACRAL CO CHROM - [id number]  SCREW SPNL L40MM DIA6.5MM THORACOLUMBOSACRAL CO CHROM  [employer] SOFAMOR DANEK-WD  N/A 2 Implanted   SET SCR SPNL DIA4. 75MM TI BRK OFF [facility] [id number]  SET SCR SPNL DIA4. 75MM TI BRK OFF [facility]  [employer] SOFAMOR DANEK-WD  N/A 2 Implanted HELIO SPNL L55MM DIA4. 75MM CO CHROM PREBENT [facility] - [id number]  HELIO SPNL L55MM DIA4. 75MM CO CHROM PREBENT [facility] SOFAMOR DANEK-WD  N/A 1 Implanted   HELIO SPNL L60MM DIA4. 75MM CO CHROM PREBENT [facility] - [id number]  HELIO SPNL L60MM DIA4. 75MM CO CHROM [facility] SOFAMOR DANEK-WD  N/A 1 Implanted     Drains:   [REMOVED] Closed/Suction Drain Right;Distal Back Accordion (Removed)   Site Description Clean, dry & intact; Ecchymotic 12/07/22 0828   Dressing Status Clean, dry & intact 12/07/22 0828   Drainage Appearance Dark Red 12/07/22 0828   Drain Status Compressed 12/07/22 0828   Output (ml) 65 ml 12/07/22 0550       [REMOVED] Urinary Catheter 12/05/22 2 Way (Removed)   $ Urethral catheter insertion Inserted for procedure 12/05/22 1833   Site Assessment No urethral drainage 12/06/22 0311   Urine Color Yellow 12/06/22 1200   Urine Appearance Clear 12/06/22 1200   Urine Odor Other (Comment) 12/06/22 0311   Collection Container Standard 12/06/22 1200   Securement Method Leg strap 12/06/22 1200   Catheter Care Completed Yes 12/06/22 0311   Catheter Best Practices  Drainage tube clipped to bed;Catheter secured to thigh; Bag below bladder;Bag not on floor;Drainage bag less than half full 12/06/22 1200   Status Draining 12/06/22 1200   Output (mL) 1900 mL 12/06/22 1230     Findings: 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 Beth Fierro is a pleasant 53-year old  gentleman with history of multiple previous laminectomies and instrumented fusions L3-S1. 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  Treatment options were discussed with patient for continued nonoperative management with activity modifications, by mouth medications, and pain management interventional treatment versus operative intervention with laminectomy and extension of fusion L2-3. Benefits and risks of surgery including but not limited to bleeding, infection, damage to nerves and blood vessels, wound complications, residual pain and stiffness, need for additional surgery, blood clots, pseudoarthrosis/nonunion, hardware complications, spinal fluid leak, risks of anesthesia, and death. Due to symptoms affecting patient's daily quality of life and limiting activities, patient elected to proceed with surgery. Technique:    Patient was met in the preoperative holding area at which time history and physical was performed, informed consent was obtained, and operative site was marked. Patient was then transported from the preoperative holding area to the operating theatre and placed under general anesthesia without difficulty by the anesthesia team. Patient was then transferred from the stretcher to the spinal Breezy table in the prone position. All bony prominences, axillae, and eyes were protected and padded. Perioperative antibiotics were given for surgical prophylaxis and SCD cuffs were placed and maintained during the procedure for VTE prophylaxis. Safety strap was secured across the thighs. Surgical site was then prepped and draped in standard sterile fashion. A formal timeout procedure was then performed and agreed upon by all personnel present room confirming correct patient, operative procedure, and operative site. A number 10 blade was then used to sharply incise skin and subcutaneous tissue along proximal extent of previous midline incision and extended some proximally. Bovie electrocautery was then used to continue dissection deeply until encountering the lumbar dorsal fascia over the midline. Dissection was then continued in the midline to expose the L2 and residual L3 spinous processes and then continued out laterally to expose previously placed L3-L4 instrumentation.  Bilateral L1-2 facet joints were then exposed taking care to preserve capsules. Dissection was then continued laterally to expose bilateral L2 transverse processes. High-speed cecelia was then used to make starting points for bilateral L2 screws using an anatomic technique. Pedicles were then cannulated with gear shift pedicle probes and pedicle markers placed. Appropriate trajectory was confirmed with AP and lateral fluoroscopy. Medtronic Solera 6.5 x 40 mm screws were placed bilaterally. Attention was then directed back towards the midline and a Leksill rongeur was used to remove the entire L2 and residual L3 spinous process down to the base and spinolaminar junction. A curved curette was then used to define the edge of previous laminectomy from prior surgery. Kerrison rongeurs were then used to perform our laminectomy removing residual L3 lamina bilaterally and the entire L2 lamina bilaterally. Partial medial facetectomy L2-3 and foraminotomies L2 and L3 were performed and Emmanuel elevator passed freely along the lateral recesses and out foramina bilaterally. A high-speed cecelia was then used to decorticate bilateral L2 and L3 transverse processes as well as the residual lateral margins of the L2-3 facet joints and fusion mass from previous fusion. Combination of autograft from laminectomy, Progenix DBM, and Vivagen morselized grafts were then packed along each posterolateral gutter. Two Medtronic cobalt chrome rods were then placed between bilateral L2 and L3 pedicle screws with Medtronic Solera santiago side connector and provisionally secured in place with set screws. Harley length was confirmed to be appropriate and then all 4 set screws were final tightened bilaterally. Hemostasis of any remaining soft tissue bleeding was obtained. Hemovac drain was placed. Multilayered closure was then performed using 0-Vicryl for deep fascial closure, 2-0 Vicryl for subcutaneous closure, and 4-0 Monocryl in running subcuticular fashion for skin approximation.  Dermabond skin glue was then placed

## (undated) DEVICE — SUTURE VCRL + SZ 2-0 L27IN ABSRB WHT SH 1/2 CIR TAPERCUT VCP417H

## (undated) DEVICE — SPONGE GZ 4X4 IN 16-PLY DETECTABLE W/ DMT MSTR TAG

## (undated) DEVICE — SOLUTION IRRIG 1000ML 0.9% SOD CHL USP POUR PLAS BTL

## (undated) DEVICE — KIT EVAC 400CC DIA2.3MM PVC RELIABLE SUCT DRNGE 3 SPR RND H

## (undated) DEVICE — DRESSING TRNSPAR W5XL4.5IN FLM SHT SEMIPERMEABLE WIND

## (undated) DEVICE — DRAPE,UTILITY,XL,4/PK,STERILE: Brand: MEDLINE

## (undated) DEVICE — INSERT CUSH STD PRONEVIEW

## (undated) DEVICE — SUTURE VCRL SZ 0 L18IN ABSRB VLT L36MM CT-1 1/2 CIR J740D

## (undated) DEVICE — NEPTUNE E-SEP 165MM SUCTION SLEEVE: Brand: NEPTUNE E-SEP

## (undated) DEVICE — Device

## (undated) DEVICE — TOTAL TRAY, 16FR 10ML SIL FOLEY, URN: Brand: MEDLINE

## (undated) DEVICE — BLADE ES L6IN ELASTOMERIC COAT INSUL DURABLE BEND UPTO

## (undated) DEVICE — SYSTEM SKIN CLSR 22CM DERMBND PRINEO

## (undated) DEVICE — COVER,LIGHT HANDLE,FLX,2/PK: Brand: MEDLINE INDUSTRIES, INC.

## (undated) DEVICE — 3.0MM ROUND FLUTED SOFT TOUCH

## (undated) DEVICE — INTENDED FOR TISSUE SEPARATION, AND OTHER PROCEDURES THAT REQUIRE A SHARP SURGICAL BLADE TO PUNCTURE OR CUT.: Brand: BARD-PARKER ® CARBON RIB-BACK BLADES

## (undated) DEVICE — SUTURE MCRYL SZ 3 0 L18IN ABSRB UD PS 2 3 8 CIR REV CUT NDL MCP497G

## (undated) DEVICE — CODMAN® SURGICAL PATTIES 1/2" X 3" (1.27CM X 7.62CM): Brand: CODMAN®

## (undated) DEVICE — CLEANER,CAUTERY TIP,2X2",STERILE: Brand: MEDLINE

## (undated) DEVICE — Z INACTIVE - UOM CHANGE USE 2854051 - SPONGE DRN W4XL4IN RAYON/POLYESTER 6 PLY NONWOVEN PRECUT

## (undated) DEVICE — SPONGE,NEURO,0.5"X3",XR,STRL,LF,10/PK: Brand: MEDLINE

## (undated) DEVICE — PAD,NON-ADHERENT,3X8,STERILE,LF,1/PK: Brand: MEDLINE

## (undated) DEVICE — COVER,C-ARM,41X74: Brand: MEDLINE

## (undated) DEVICE — SUTURE VCRL SZ 2-0 L18IN ABSRB UD CT-1 L36MM 1/2 CIR J839D

## (undated) DEVICE — C-ARMOR C-ARM EQUIPMENT COVERS CLEAR STERILE UNIVERSAL FIT 12 PER CASE: Brand: C-ARMOR

## (undated) DEVICE — BUR CUT RND FLUT SFT TOUCH 4.0MM

## (undated) DEVICE — SVMMC ORTH SPINE PK

## (undated) DEVICE — MARKER,SKIN,WI/RULER AND LABELS: Brand: MEDLINE

## (undated) DEVICE — PAD GZ BORD ST 4INX10IN 2X8IN

## (undated) DEVICE — BLADE BLUNT 10MM W/TUBE SET NEXUS

## (undated) DEVICE — GOWN,SIRUS,POLYRNF,BRTHSLV,2XL,18/CS: Brand: MEDLINE

## (undated) DEVICE — CONTAINER,SPECIMEN,4OZ,OR STRL: Brand: MEDLINE

## (undated) DEVICE — COVER,TABLE,HEAVY DUTY,50"X90",STRL: Brand: MEDLINE

## (undated) DEVICE — GAUZE,SPONGE,4"X4",16PLY,STRL,LF,10/TRAY: Brand: MEDLINE

## (undated) DEVICE — WAX SURG 2.5GM HEMSTAT BNE BEESWAX PARAFFIN ISO PALMITATE

## (undated) DEVICE — ADHESIVE SKIN CLOSURE 0.7CC TOP MICROBIAL APPL DERMBND ADV

## (undated) DEVICE — NEEDLE SPNL L3.5IN PNK HUB S STL REG WALL FIT STYL W/ QNCKE

## (undated) DEVICE — GLOVE SURG SZ 85 L12IN THK75MIL DK GRN LTX FREE

## (undated) DEVICE — GLOVE ORANGE PI 8   MSG9080